# Patient Record
Sex: FEMALE | Race: WHITE | NOT HISPANIC OR LATINO | Employment: FULL TIME | URBAN - METROPOLITAN AREA
[De-identification: names, ages, dates, MRNs, and addresses within clinical notes are randomized per-mention and may not be internally consistent; named-entity substitution may affect disease eponyms.]

---

## 2018-12-26 PROBLEM — O14.90 PRE-ECLAMPSIA: Status: ACTIVE | Noted: 2018-12-26

## 2018-12-26 PROBLEM — D64.9 ANEMIA: Status: ACTIVE | Noted: 2018-12-26

## 2018-12-26 PROBLEM — O11.9 PRE-ECLAMPSIA SUPERIMPOSED ON CHRONIC HYPERTENSION: Status: ACTIVE | Noted: 2018-12-26

## 2018-12-27 ENCOUNTER — APPOINTMENT (OUTPATIENT)
Dept: INTERNAL MEDICINE | Facility: MEDICAL CENTER | Age: 23
End: 2018-12-27
Payer: MEDICAID

## 2019-01-28 ENCOUNTER — TELEPHONE (OUTPATIENT)
Dept: SCHEDULING | Facility: IMAGING CENTER | Age: 24
End: 2019-01-28

## 2019-03-01 ENCOUNTER — OFFICE VISIT (OUTPATIENT)
Dept: INTERNAL MEDICINE | Facility: MEDICAL CENTER | Age: 24
End: 2019-03-01
Payer: MEDICAID

## 2019-03-01 VITALS
DIASTOLIC BLOOD PRESSURE: 98 MMHG | HEART RATE: 78 BPM | TEMPERATURE: 98.7 F | SYSTOLIC BLOOD PRESSURE: 134 MMHG | OXYGEN SATURATION: 98 % | WEIGHT: 152.8 LBS | HEIGHT: 66 IN | BODY MASS INDEX: 24.56 KG/M2

## 2019-03-01 DIAGNOSIS — L70.9 ACNE, UNSPECIFIED ACNE TYPE: ICD-10-CM

## 2019-03-01 DIAGNOSIS — F32.A ANXIETY AND DEPRESSION: ICD-10-CM

## 2019-03-01 DIAGNOSIS — I10 HYPERTENSION, UNSPECIFIED TYPE: Chronic | ICD-10-CM

## 2019-03-01 DIAGNOSIS — D64.9 ANEMIA, UNSPECIFIED TYPE: ICD-10-CM

## 2019-03-01 DIAGNOSIS — Z00.00 HEALTH CARE MAINTENANCE: ICD-10-CM

## 2019-03-01 DIAGNOSIS — I77.810 AORTIC ROOT DILATATION (HCC): ICD-10-CM

## 2019-03-01 DIAGNOSIS — F41.9 ANXIETY AND DEPRESSION: ICD-10-CM

## 2019-03-01 PROCEDURE — 99204 OFFICE O/P NEW MOD 45 MIN: CPT | Mod: GC | Performed by: INTERNAL MEDICINE

## 2019-03-01 RX ORDER — LISINOPRIL 5 MG/1
5 TABLET ORAL DAILY
Qty: 30 TAB | Refills: 0 | Status: SHIPPED | OUTPATIENT
Start: 2019-03-01 | End: 2019-04-06 | Stop reason: SDUPTHER

## 2019-03-01 ASSESSMENT — PATIENT HEALTH QUESTIONNAIRE - PHQ9
SUM OF ALL RESPONSES TO PHQ QUESTIONS 1-9: 15
5. POOR APPETITE OR OVEREATING: 2 - MORE THAN HALF THE DAYS
CLINICAL INTERPRETATION OF PHQ2 SCORE: 1

## 2019-03-01 NOTE — ASSESSMENT & PLAN NOTE
3/1/19-update    Influenza- 2018  Tdap -8/7/18  Pap smear- last year- negative  Not a candidate mammogram/colonscopy/DEXA yet

## 2019-03-01 NOTE — PROGRESS NOTES
New Patient to Establish    Reason to establish: Never had PCP in the past and evaluation of ongoing symptoms    CC: high BP    HPI: 23 yr old female patient with PMHx of HTN, aortic root dilatation comes in to establish care.  Patient never followed up with PCP in the past. Followed up with OBG/GYN and pediatric cardiology in the past for her chronic conditions.    # HTN: Diagnosed at age 13 and followed up with pediatric cardiology since then. Was on lisinopril intermittent on different doses. During recent pregnancy was on labetalol but currently not on any mediations. Patient does not check her BPs at home. Patient does report of intermittent palpitations, SOB on excessive exertion when is running or fast walking or working out along with intermittent headaches. Denies chest pain/LOC/orthopnea/PND. Patient also has insomnia and unable to sleep.     # Aortic root dilatation: Patient was told by her pediatric cardiologist that she has this condition. As mentioned above she denies other symptoms.Recently when she followed up with cardiology he told follow up with adult cardiology as soon as possible. Daughter youngest one has heart murmur.    #Anxiety and depression:  Chronic history. Feels lonely. PHQ9 15. Denies SI/HI. Patient has chronic anxiety and depression.States she does not have anybody to talk to or share her problems.Under lot of stress adjusting to her current living situation.Has history of multiple stressors in the past.    Patient Active Problem List    Diagnosis Date Noted   • Aortic root dilatation (HCC) 03/01/2019   • Health care maintenance 03/01/2019   • Pre-eclampsia superimposed on chronic hypertension 12/26/2018   • Anemia 12/26/2018   • Hypertension 04/23/2015   • IUGR (intrauterine growth restriction) 04/23/2015       Past Medical History:   Diagnosis Date   • Anxiety    • Depression    • Head ache    • Hypertension        Current Outpatient Prescriptions   Medication Sig Dispense Refill    • labetalol (NORMODYNE) 300 MG TABS Take 300 mg by mouth 3 times a day.     • Prenatal MV-Min-Fe Fum-FA-DHA (PRENATAL 1 PO) Take  by mouth.       No current facility-administered medications for this visit.        Allergies as of 03/01/2019   • (No Known Allergies)       Social History     Social History   • Marital status: Single     Spouse name: N/A   • Number of children: N/A   • Years of education: N/A     Occupational History   • Not on file.     Social History Main Topics   • Smoking status: Former Smoker     Packs/day: 1.00     Years: 5.00   • Smokeless tobacco: Never Used   • Alcohol use No   • Drug use: Yes     Types: Marijuana      Comment: 1-2 times a day -1gm   • Sexual activity: Yes     Partners: Male     Birth control/ protection: Surgical     Other Topics Concern   • Not on file     Social History Narrative   • No narrative on file       Family History   Problem Relation Age of Onset   • Alcohol/Drug Mother    • Hypertension Father    • Arthritis Maternal Grandmother    • Cancer Maternal Grandfather    • Hypertension Maternal Grandfather    • Hypertension Paternal Grandmother    • Heart Disease Paternal Grandfather    • Hypertension Paternal Grandfather        Past Surgical History:   Procedure Laterality Date   • GYN SURGERY      c/s   • PRIMARY C SECTION         ROS: As per HPI. Additional pertinent symptoms as noted below.    Constitutional: Denies fever/chills/weight changes.   Eyes: Denies changes/pain in vision  ENT: Denies sore throat/congestion/ear ache.   Cardiovascular: Denies chest pain/edema. +Intermittent palpitations.  Respiratory: Denies SOB/cough/PND/orthopnea  Abdomen: Denies difficulty swallowing/ diarrhea/constipation/abdominal pain/nausea/vomiting  Genitourinary: Normal urinary habits.   Musculo-skeletal: normal ambulation.Denies muscle or joint pains.  Skin: Denies rash/lesions.  Neurological: Denies weakness/tingling/numbness. + intermittent headache  Psychological: good mood  "and cooperative.+insomnia,anxiety and depression.         /96 (BP Location: Left arm, Patient Position: Sitting, BP Cuff Size: Adult)   Pulse 78   Temp 37.1 °C (98.7 °F)   Ht 1.681 m (5' 6.2\")   Wt 69.3 kg (152 lb 12.8 oz)   SpO2 98%   BMI 24.51 kg/m²     Physical Exam  General:  Alert and oriented, No apparent distress.    Eyes: Pupils equal and reactive. No scleral icterus.    Throat: Clear no erythema or exudates noted.    Neck: Supple. No lymphadenopathy noted. Thyroid not enlarged.    Lungs: Clear to auscultation and percussion bilaterally.    Cardiovascular: Regular rate and rhythm. No murmurs, rubs or gallops.    Abdomen:  Benign. No rebound or guarding noted.    Extremities: No clubbing, cyanosis, edema.    Skin: Clear. No rash or suspicious skin lesions noted.    Neurological: Oriented to time, place, and person .Cranial nerves intact. No motor/sensory deficits.Reflexes were normal and symmetrical in both upper and lower extremities     Musculoskeletal : NROM of all extremities. No tenderness or deformity noted.     Note: I have reviewed all pertinent labs and diagnostic tests associated with this visit with specific comments listed under the assessment and plan below    Assessment and Plan    1. Hypertension, unspecified type  At the age of 13- dx with HTN  Was on lisinopril initially.  During pregnancy was on labetolol  4 months back delivered baby- currently not on any medication.  Today BP is 144/96 with repeat level 134/98  Reports of occasional palpitations and headaches.  - ordered CMP,micro albumin creatinine ratio for next visit and started on lisinopril 5 mg PO once daily.    2. Aortic root dilatation (HCC)  Dx 2016  Was following up with pediatric cardiology -Cesar Palafox from age 13 .  Occasional palpitations, SOB on excessive exertion like running.  Currently not on any medications.  Ordered referral to cardiology.    3. Anemia, unspecified type  Was noted in the prior labs in " the records.  States she was worked up [facility].Requested records.  Ordered CBC for next visit and will consider further work up after results of lab work.  Denies active bleeding issues.    4. Anxiety and depression  Chronic history  PHQ9 15  Denies SI/HI  Ordered referral to psychology  Will consider further management after reviewing labs next visit  CBC,CMP,TSH ordered for next visit    5. Acne, unspecified acne type  Has noted to have multiple scattered lesions in different stage  Ordered referral to dermatology as she wished to follow up with dermatologist though offered treatment. If she is unable to get to see dermatology soon then will consider starting on treatment with benzoyl peroxide and topical antibiotics next visit.    6. Health care maintenance  Influenza- 2018  Tdap -8/7/18  Pap smear- last year- negative  Not a candidate mammogram/colonscopy/DEXA yet  Requested all records. Will update after reviewing prior records.        Risk Assessment (discuss potential complications a function of chronic problems): spent 35 min's discussing plans of management and educating patient regarding her current conditions and related complications    Complexity (discuss number of co-morbidities): discussed HTN, Aortic root dilatation, anemia, anxiety and depression, acne and preventive screening measures.    Signed by: Arben Mei M.D.

## 2019-03-01 NOTE — PATIENT INSTRUCTIONS
Generalized Anxiety Disorder  Generalized anxiety disorder (IDRIS) is a mental disorder. It interferes with life functions, including relationships, work, and school.  IDRIS is different from normal anxiety, which everyone experiences at some point in their lives in response to specific life events and activities. Normal anxiety actually helps us prepare for and get through these life events and activities. Normal anxiety goes away after the event or activity is over.   IDRIS causes anxiety that is not necessarily related to specific events or activities. It also causes excess anxiety in proportion to specific events or activities. The anxiety associated with IDRIS is also difficult to control. IDRIS can vary from mild to severe. People with severe IDRIS can have intense waves of anxiety with physical symptoms (panic attacks).   SYMPTOMS  The anxiety and worry associated with IDRIS are difficult to control. This anxiety and worry are related to many life events and activities and also occur more days than not for 6 months or longer. People with IDRIS also have three or more of the following symptoms (one or more in children):  · Restlessness.    · Fatigue.  · Difficulty concentrating.    · Irritability.  · Muscle tension.  · Difficulty sleeping or unsatisfying sleep.  DIAGNOSIS  IDRIS is diagnosed through an assessment by your health care provider. Your health care provider will ask you questions about your mood, physical symptoms, and events in your life. Your health care provider may ask you about your medical history and use of alcohol or drugs, including prescription medicines. Your health care provider may also do a physical exam and blood tests. Certain medical conditions and the use of certain substances can cause symptoms similar to those associated with IDRIS. Your health care provider may refer you to a mental health specialist for further evaluation.  TREATMENT  The following therapies are usually used to treat IDRIS:    · Medication. Antidepressant medication usually is prescribed for long-term daily control. Antianxiety medicines may be added in severe cases, especially when panic attacks occur.    · Talk therapy (psychotherapy). Certain types of talk therapy can be helpful in treating IDRIS by providing support, education, and guidance. A form of talk therapy called cognitive behavioral therapy can teach you healthy ways to think about and react to daily life events and activities.  · Stress management techniques. These include yoga, meditation, and exercise and can be very helpful when they are practiced regularly.  A mental health specialist can help determine which treatment is best for you. Some people see improvement with one therapy. However, other people require a combination of therapies.  This information is not intended to replace advice given to you by your health care provider. Make sure you discuss any questions you have with your health care provider.  Document Released: 04/14/2014 Document Revised: 01/08/2016 Document Reviewed: 04/14/2014  [employer] Interactive Patient Education © 2017 [employer]  Depression, Adult  Depression refers to feeling sad, low, down in the dumps, blue, gloomy, or empty. In general, there are two kinds of depression:  1. Normal sadness or normal grief. This kind of depression is one that we all feel from time to time after upsetting life experiences, such as the loss of a job or the ending of a relationship. This kind of depression is considered normal, is short lived, and resolves within a few days to 2 weeks. Depression experienced after the loss of a loved one (bereavement) often lasts longer than 2 weeks but normally gets better with time.  2. Clinical depression. This kind of depression lasts longer than normal sadness or normal grief or interferes with your ability to function at home, at work, and in school. It also interferes with your personal relationships. It affects almost  every aspect of your life. Clinical depression is an illness.  Symptoms of depression can also be caused by conditions other than those mentioned above, such as:  · Physical illness. Some physical illnesses, including underactive thyroid gland (hypothyroidism), severe anemia, specific types of cancer, diabetes, uncontrolled seizures, heart and lung problems, strokes, and chronic pain are commonly associated with symptoms of depression.  · Side effects of some prescription medicine. In some people, certain types of medicine can cause symptoms of depression.  · Substance abuse. Abuse of alcohol and illicit drugs can cause symptoms of depression.  SYMPTOMS  Symptoms of normal sadness and normal grief include the following:  · Feeling sad or crying for short periods of time.  · Not caring about anything (apathy).  · Difficulty sleeping or sleeping too much.  · No longer able to enjoy the things you used to enjoy.  · Desire to be by oneself all the time (social isolation).  · Lack of energy or motivation.  · Difficulty concentrating or remembering.  · Change in appetite or weight.  · Restlessness or agitation.  Symptoms of clinical depression include the same symptoms of normal sadness or normal grief and also the following symptoms:  · Feeling sad or crying all the time.  · Feelings of guilt or worthlessness.  · Feelings of hopelessness or helplessness.  · Thoughts of suicide or the desire to harm yourself (suicidal ideation).  · Loss of touch with reality (psychotic symptoms). Seeing or hearing things that are not real (hallucinations) or having false beliefs about your life or the people around you (delusions and paranoia).  DIAGNOSIS   The diagnosis of clinical depression is usually based on how bad the symptoms are and how long they have lasted. Your health care provider will also ask you questions about your medical history and substance use to find out if physical illness, use of prescription medicine, or  substance abuse is causing your depression. Your health care provider may also order blood tests.  TREATMENT   Often, normal sadness and normal grief do not require treatment. However, sometimes antidepressant medicine is given for bereavement to ease the depressive symptoms until they resolve.  The treatment for clinical depression depends on how bad the symptoms are but often includes antidepressant medicine, counseling with a mental health professional, or both. Your health care provider will help to determine what treatment is best for you.  Depression caused by physical illness usually goes away with appropriate medical treatment of the illness. If prescription medicine is causing depression, talk with your health care provider about stopping the medicine, decreasing the dose, or changing to another medicine.  Depression caused by the abuse of alcohol or illicit drugs goes away when you stop using these substances. Some adults need professional help in order to stop drinking or using drugs.  SEEK IMMEDIATE MEDICAL CARE IF:  · You have thoughts about hurting yourself or others.  · You lose touch with reality (have psychotic symptoms).  · You are taking medicine for depression and have a serious side effect.  FOR MORE INFORMATION  · National [place] on Mental Illness: www.jana.org   · National [place] of Mental Health: www.nimh.nih.gov      This information is not intended to replace advice given to you by your health care provider. Make sure you discuss any questions you have with your health care provider.     Document Released: 12/15/2001 Document Revised: 01/08/2016 Document Reviewed: 03/18/2013  [employer] Interactive Patient Education ©2016 [employer]

## 2019-03-01 NOTE — ASSESSMENT & PLAN NOTE
At the age of 13 dx with HTN  Was on lisinopril initially.  During pregnancy was on labetolol  4 months back delivered baby- currently not on any medication.

## 2019-03-01 NOTE — LETTER
[facility]  Arben Mei M.D.  [address]  [place] [address]  Fax: [phone]   Authorization for Release/Disclosure of   Protected Health Information   Name: RICHELLE US : 1995 SSN: [id number]   Address: [address]  [place] [address] Phone:    [phone] (home)    I authorize the entity listed below to release/disclose the PHI below to:   [facility]/Arben Mei M.D. and Arben Mei M.D.   Provider or Entity Name:  Dr Menchaca gyn    Address   City, [facility], [place]   Phone:      Fax:     Reason for request: continuity of care   Information to be released:    [  ] LAST COLONOSCOPY,  including any PATH REPORT and follow-up  [  ] LAST FIT/COLOGUARD RESULT [  ] LAST DEXA  [  ] LAST MAMMOGRAM  [  ] LAST PAP  [  ] LAST LABS [  ] RETINA EXAM REPORT  [  ] IMMUNIZATION RECORDS  [  ] Release all info      [  ] Check here and initial the line next to each item to release ALL health information INCLUDING  _____ Care and treatment for drug and / or alcohol abuse  _____ HIV testing, infection status, or AIDS  _____ Genetic Testing    DATES OF SERVICE OR TIME PERIOD TO BE DISCLOSED: _____________  I understand and acknowledge that:  * This Authorization may be revoked at any time by you in writing, except if your health information has already been used or disclosed.  * Your health information that will be used or disclosed as a result of you signing this authorization could be re-disclosed by the recipient. If this occurs, your re-disclosed health information may no longer be protected by State or Federal laws.  * You may refuse to sign this Authorization. Your refusal will not affect your ability to obtain treatment.  * This Authorization becomes effective upon signing and will  on (date) __________.      If no date is indicated, this Authorization will  one (1) year from the signature date.    Name: Richelle Us    Signature:   Date:     3/1/2019       PLEASE FAX  REQUESTED RECORDS BACK TO: [phone]

## 2019-03-01 NOTE — LETTER
[facility]  Arben Mei M.D.  [address]  [place] [address]  Fax: [phone]   Authorization for Release/Disclosure of   Protected Health Information   Name: RICHELLE US : 1995 SSN: [id number]   Address: [address]  [place] [address] Phone:    [phone] (home)    I authorize the entity listed below to release/disclose the PHI below to:   [facility]/Arben Mei M.D. and Arben Mei M.D.   Provider or Entity Name:  Dr winston  cards   Address   City, State, [facility]   Phone:      Fax:     Reason for request: continuity of care   Information to be released:    [  ] LAST COLONOSCOPY,  including any PATH REPORT and follow-up  [  ] LAST FIT/COLOGUARD RESULT [  ] LAST DEXA  [  ] LAST MAMMOGRAM  [  ] LAST PAP  [  ] LAST LABS [  ] RETINA EXAM REPORT  [  ] IMMUNIZATION RECORDS  [ X ] Release all info      [  ] Check here and initial the line next to each item to release ALL health information INCLUDING  _____ Care and treatment for drug and / or alcohol abuse  _____ HIV testing, infection status, or AIDS  _____ Genetic Testing    DATES OF SERVICE OR TIME PERIOD TO BE DISCLOSED: _____________  I understand and acknowledge that:  * This Authorization may be revoked at any time by you in writing, except if your health information has already been used or disclosed.  * Your health information that will be used or disclosed as a result of you signing this authorization could be re-disclosed by the recipient. If this occurs, your re-disclosed health information may no longer be protected by State or Federal laws.  * You may refuse to sign this Authorization. Your refusal will not affect your ability to obtain treatment.  * This Authorization becomes effective upon signing and will  on (date) __________.      If no date is indicated, this Authorization will  one (1) year from the signature date.    Name: Richelle Us    Signature:   Date:     3/1/2019       PLEASE FAX  REQUESTED RECORDS BACK TO: [phone]

## 2019-03-06 ENCOUNTER — TELEPHONE (OUTPATIENT)
Dept: INTERNAL MEDICINE | Facility: MEDICAL CENTER | Age: 24
End: 2019-03-06

## 2019-03-06 ENCOUNTER — TELEPHONE (OUTPATIENT)
Dept: CARDIOLOGY | Facility: MEDICAL CENTER | Age: 24
End: 2019-03-06

## 2019-03-06 NOTE — TELEPHONE ENCOUNTER
Pt called LM asking if we could possibly send her referral to another place that takes her insurance,pt states she would like her referral for dermatology sent somewhere where they are accepting new medicaid pts, and for her psychiatrist send where they take her insurance.

## 2019-03-08 ENCOUNTER — OFFICE VISIT (OUTPATIENT)
Dept: CARDIOLOGY | Facility: MEDICAL CENTER | Age: 24
End: 2019-03-08
Payer: MEDICAID

## 2019-03-08 VITALS
WEIGHT: 149 LBS | HEART RATE: 86 BPM | OXYGEN SATURATION: 99 % | SYSTOLIC BLOOD PRESSURE: 150 MMHG | DIASTOLIC BLOOD PRESSURE: 100 MMHG | BODY MASS INDEX: 23.95 KG/M2 | HEIGHT: 66 IN

## 2019-03-08 DIAGNOSIS — I77.810 AORTIC ROOT DILATION (HCC): ICD-10-CM

## 2019-03-08 DIAGNOSIS — I10 ESSENTIAL HYPERTENSION: Chronic | ICD-10-CM

## 2019-03-08 DIAGNOSIS — I77.810 AORTIC ROOT DILATATION (HCC): ICD-10-CM

## 2019-03-08 LAB — EKG IMPRESSION: NORMAL

## 2019-03-08 PROCEDURE — 93000 ELECTROCARDIOGRAM COMPLETE: CPT | Performed by: INTERNAL MEDICINE

## 2019-03-08 PROCEDURE — 99214 OFFICE O/P EST MOD 30 MIN: CPT | Performed by: INTERNAL MEDICINE

## 2019-03-08 ASSESSMENT — ENCOUNTER SYMPTOMS
COUGH: 1
EYE PAIN: 1
PND: 0
HEADACHES: 1
INSOMNIA: 0
LOSS OF CONSCIOUSNESS: 0
DIAPHORESIS: 1
CONSTIPATION: 1
CHILLS: 1
PALPITATIONS: 0
WEIGHT LOSS: 1
HEARTBURN: 1
FEVER: 0
SHORTNESS OF BREATH: 1
NAUSEA: 1
BACK PAIN: 1
TINGLING: 1
BLURRED VISION: 0
ORTHOPNEA: 0
ABDOMINAL PAIN: 1
WEAKNESS: 1
NERVOUS/ANXIOUS: 1
MYALGIAS: 0
DIZZINESS: 1
BRUISES/BLEEDS EASILY: 1

## 2019-03-08 NOTE — TELEPHONE ENCOUNTER
Called spoke to pt and notified her of Elly's notes, pt agreed and stated she would f/u with Dr. Mei at her appointment.

## 2019-03-09 NOTE — PROGRESS NOTES
Aortic root dilatation      Subjective:   Keiko Us is a 23 y.o. female who presents today referred by Dr. Baldemar Amaya, pediatric cardiologist for ongoing care for aortic root dilatation and hypertension.    The patient reports a history of hypertension diagnosed at age 13 treated with lisinopril except during her 3 pregnancies in which she was apparently transitioned to labetalol.    Last delivery was 4 months ago and was started back on lisinopril 5 mg daily.    The patient does not monitor her blood pressure.    The patient was formally last seen as a patient by Dr. Amaya 2 years ago but recently was in his clinic with her 4-year-old daughter and it was strongly suggested that she secure a cardiology appointment.    The patient has been under a lot of stress.    The patient denies chest pain or shortness of breath.    Has been under the care of Dr. Parks, high risk OB  Last delivery [facility].    Social history  Single.  3 children 6-year-old, 4-year-old and 4-month-old.    Past Medical History:   Diagnosis Date   • Anxiety    • Depression    • Head ache    • Hypertension      Past Surgical History:   Procedure Laterality Date   • GYN SURGERY      c/s   • PRIMARY C SECTION       Family History   Problem Relation Age of Onset   • Alcohol/Drug Mother    • Hypertension Father    • Arthritis Maternal Grandmother    • Cancer Maternal Grandfather    • Hypertension Maternal Grandfather    • Hypertension Paternal Grandmother    • Heart Disease Paternal Grandfather    • Hypertension Paternal Grandfather      Social History     Social History   • Marital status: Single     Spouse name: N/A   • Number of children: N/A   • Years of education: N/A     Occupational History   • Not on file.     Social History Main Topics   • Smoking status: Former Smoker     Packs/day: 1.00     Years: 5.00   • Smokeless tobacco: Never Used   • Alcohol use No   • Drug use: Yes     Types: Marijuana      Comment: 1-2 times a day -1gm  "  • Sexual activity: Yes     Partners: Male     Birth control/ protection: Surgical     Other Topics Concern   • Not on file     Social History Narrative   • No narrative on file     No Known Allergies  Outpatient Encounter Prescriptions as of 3/8/2019   Medication Sig Dispense Refill   • lisinopril (PRINIVIL) 5 MG Tab Take 1 Tab by mouth every day. 30 Tab 0   • labetalol (NORMODYNE) 300 MG TABS Take 300 mg by mouth 3 times a day.     • Prenatal MV-Min-Fe Fum-FA-DHA (PRENATAL 1 PO) Take  by mouth.       No facility-administered encounter medications on file as of 3/8/2019.      Review of Systems   Constitutional: Positive for chills, diaphoresis, malaise/fatigue and weight loss. Negative for fever.   HENT: Positive for tinnitus. Negative for congestion.    Eyes: Positive for pain. Negative for blurred vision.   Respiratory: Positive for cough and shortness of breath.    Cardiovascular: Negative for chest pain, palpitations, orthopnea, leg swelling and PND.   Gastrointestinal: Positive for abdominal pain, constipation, heartburn and nausea.   Genitourinary: Negative for dysuria.   Musculoskeletal: Positive for back pain. Negative for joint pain and myalgias.   Skin: Positive for itching and rash.   Neurological: Positive for dizziness, tingling, weakness and headaches. Negative for loss of consciousness.   Endo/Heme/Allergies: Bruises/bleeds easily.   Psychiatric/Behavioral: The patient is nervous/anxious. The patient does not have insomnia.         Objective:   /100 (BP Location: Left arm, Patient Position: Sitting, BP Cuff Size: Adult)   Pulse 86   Ht 1.681 m (5' 6.2\")   Wt 67.6 kg (149 lb)   SpO2 99%   BMI 23.90 kg/m²     Physical Exam   Constitutional: She is oriented to person, place, and time. She appears well-developed and well-nourished.   HENT:   Head: Normocephalic and atraumatic.   Eyes: Pupils are equal, round, and reactive to light. EOM are normal. No scleral icterus.   Neck: Neck supple. No " JVD present. No thyromegaly present.   Cardiovascular: Normal rate, regular rhythm, normal heart sounds and intact distal pulses.  Exam reveals no gallop and no friction rub.    No murmur heard.  Pulmonary/Chest: Effort normal and breath sounds normal. No respiratory distress. She has no wheezes. She has no rales.   Abdominal: Soft. Bowel sounds are normal. She exhibits no mass. There is no tenderness.   Musculoskeletal: Normal range of motion. She exhibits no edema or deformity.   Lymphadenopathy:     She has no cervical adenopathy.   Neurological: She is alert and oriented to person, place, and time. No cranial nerve deficit. She exhibits normal muscle tone.   Skin: Skin is warm and dry.   Psychiatric: She has a normal mood and affect. Her behavior is normal.     EKG 03/08/2019 normal sinus rhythm, rate 73.  Within normal limits.  Reviewed by myself.    Assessment:     1. Aortic root dilation (HCC)     2. Aortic root dilatation (HCC)  EKG    EC-ECHOCARDIOGRAM COMPLETE W/O CONT   3. Essential hypertension  EC-ECHOCARDIOGRAM COMPLETE W/O CONT       Medical Decision Making:  Today's Assessment / Status / Plan:     Assessment  1.  Aortic root dilatation current status unknown.  2.  Hypertension.  Uncontrolled on current dose of lisinopril.    Recommendation Discussion  1.  I had a focused discussion with the patient which I emphasized the need for close blood pressure monitoring and medication adjustment as needed to normalize her blood pressure.  2.  Instructed to obtain a blood pressure cuff and maintain a daily blood pressure log.  3.  Echocardiogram.  4.  Obtain medical records from Dr. Amaya's office in [facility].  5.  Maintain communication through My chart.  6.  If blood pressure remains above 130 systolic she is to contact us for outpatient medication adjustment prior to next appointment.  7.  Follow-up 4-6 weeks to review blood pressure.

## 2019-03-13 ENCOUNTER — TELEPHONE (OUTPATIENT)
Dept: CARDIOLOGY | Facility: MEDICAL CENTER | Age: 24
End: 2019-03-13

## 2019-03-13 NOTE — TELEPHONE ENCOUNTER
I had fax medical records request to [facility] and to DR. Amaya pediatric cardiologist Fax # [phone].

## 2019-03-25 ENCOUNTER — TELEPHONE (OUTPATIENT)
Dept: CARDIOLOGY | Facility: MEDICAL CENTER | Age: 24
End: 2019-03-25

## 2019-03-25 NOTE — TELEPHONE ENCOUNTER
Medical Records from [facility]  [facility] was send to us on 03/25/2019 send to scanning.

## 2019-03-26 ENCOUNTER — HOSPITAL ENCOUNTER (OUTPATIENT)
Dept: CARDIOLOGY | Facility: MEDICAL CENTER | Age: 24
End: 2019-03-26
Attending: INTERNAL MEDICINE
Payer: MEDICAID

## 2019-03-26 DIAGNOSIS — I77.810 AORTIC ROOT DILATATION (HCC): ICD-10-CM

## 2019-03-26 DIAGNOSIS — I10 ESSENTIAL HYPERTENSION: Chronic | ICD-10-CM

## 2019-03-26 PROCEDURE — 93306 TTE W/DOPPLER COMPLETE: CPT

## 2019-03-27 ENCOUNTER — TELEPHONE (OUTPATIENT)
Dept: CARDIOLOGY | Facility: MEDICAL CENTER | Age: 24
End: 2019-03-27

## 2019-03-27 LAB
LV EJECT FRACT  99904: 65
LV EJECT FRACT MOD 2C 99903: 61.24
LV EJECT FRACT MOD 4C 99902: 60.5
LV EJECT FRACT MOD BP 99901: 60.02

## 2019-03-27 PROCEDURE — 93306 TTE W/DOPPLER COMPLETE: CPT | Mod: 26 | Performed by: INTERNAL MEDICINE

## 2019-03-27 NOTE — TELEPHONE ENCOUNTER
Reviewed result with patient.  Pt verbalizes understanding and is appreciative of information given.    -------------------  echo results   Received: Today Message Contents   Adry Zhao R.N.   Phone Number: [phone]          BRIANNA/kwasi     Pt calling for echo results, please call her on  before 4:30pm (her own # isn't working) or if after 4:30 on alternate #[phone].

## 2019-05-16 ENCOUNTER — TELEPHONE (OUTPATIENT)
Dept: CARDIOLOGY | Facility: MEDICAL CENTER | Age: 24
End: 2019-05-16

## 2019-05-16 DIAGNOSIS — R00.2 PALPITATION: ICD-10-CM

## 2019-05-16 DIAGNOSIS — I10 ESSENTIAL HYPERTENSION: Chronic | ICD-10-CM

## 2019-05-16 NOTE — TELEPHONE ENCOUNTER
"Message   Received: Today   Message Contents   Neli Pimentel R.N.   Phone Number: [phone]             BRIANNA/Chanel     Pt has question about EKG done in office back on 3/8. She would please like a call back at [phone]      Contacted patient, answered questions regarding EKG from March.     Patient reports, that when she is relaxing out of no where she will feel her heart pounding and her HR will be 122.     She also describes a near syncope event when she was washing her dishes she \"woke up when she dropped a dish\" She was unsure exactly what happened.  But she felt her HR was fast then.     Inquired if patient can come in tomorrow morning for an EKG.  Patient stated tomorrow wouldn't work.  Scheduled patient for Monday 5/20 for EKG to evaluate HR and any possible changes.    Detailed discussion about when to go to the ER or call EMS took place.  Patient verbalized all understanding.     EKG ordered    FYI to SW  "

## 2019-06-04 ENCOUNTER — APPOINTMENT (OUTPATIENT)
Dept: INTERNAL MEDICINE | Facility: MEDICAL CENTER | Age: 24
End: 2019-06-04
Payer: MEDICAID

## 2019-06-04 ENCOUNTER — PATIENT MESSAGE (OUTPATIENT)
Dept: INTERNAL MEDICINE | Facility: MEDICAL CENTER | Age: 24
End: 2019-06-04

## 2019-07-05 ENCOUNTER — OFFICE VISIT (OUTPATIENT)
Dept: CARDIOLOGY | Facility: MEDICAL CENTER | Age: 24
End: 2019-07-05
Payer: MEDICAID

## 2019-07-05 VITALS
BODY MASS INDEX: 22.5 KG/M2 | SYSTOLIC BLOOD PRESSURE: 134 MMHG | DIASTOLIC BLOOD PRESSURE: 80 MMHG | HEIGHT: 66 IN | OXYGEN SATURATION: 96 % | HEART RATE: 90 BPM | WEIGHT: 140 LBS

## 2019-07-05 DIAGNOSIS — R00.2 PALPITATIONS: ICD-10-CM

## 2019-07-05 DIAGNOSIS — I77.810 AORTIC ROOT DILATATION (HCC): ICD-10-CM

## 2019-07-05 DIAGNOSIS — I10 ESSENTIAL HYPERTENSION: Chronic | ICD-10-CM

## 2019-07-05 PROCEDURE — 99214 OFFICE O/P EST MOD 30 MIN: CPT | Performed by: INTERNAL MEDICINE

## 2019-07-05 ASSESSMENT — ENCOUNTER SYMPTOMS
DIZZINESS: 0
COUGH: 0
MYALGIAS: 0
PALPITATIONS: 0
LOSS OF CONSCIOUSNESS: 0
SHORTNESS OF BREATH: 0

## 2019-07-05 NOTE — PROGRESS NOTES
Aortic root dilatation      Subjective:   Keiko Us is a 24 y.o. female who presents today referred by Dr. Baldemar Amaya, pediatric cardiologist for ongoing care for aortic root dilatation and hypertension.    Last seen on 3/8/2019.    Since 3/8/2019 appointment the patient has noted daily palpitations without lightheadedness or syncope.  Has some orthostatic dizziness.  Compliant with lisinopril and monitoring blood pressure.  Is maintaining a home blood pressure log.    The patient reports a history of hypertension diagnosed at age 13 treated with lisinopril except during her 3 pregnancies in which she was apparently transitioned to labetalol.    Last delivery was 4 months ago and was started back on lisinopril 5 mg daily.    The patient does not monitor her blood pressure.    The patient was formally last seen as a patient by Dr. Amaya 2 years ago but recently was in his clinic with her 4-year-old daughter and it was strongly suggested that she secure a cardiology appointment.    The patient has been under a lot of stress.    The patient denies chest pain or shortness of breath.    Has been under the care of Dr. Parks, high risk OB  Last delivery [facility].    Social history  Single.  3 children 6-year-old, 4-year-old and 4-month-old.    Past Medical History:   Diagnosis Date   • Anxiety    • Depression    • Head ache    • Hypertension      Past Surgical History:   Procedure Laterality Date   • GYN SURGERY      c/s   • PRIMARY C SECTION       Family History   Problem Relation Age of Onset   • Alcohol/Drug Mother    • Hypertension Father    • Arthritis Maternal Grandmother    • Cancer Maternal Grandfather    • Hypertension Maternal Grandfather    • Hypertension Paternal Grandmother    • Heart Disease Paternal Grandfather    • Hypertension Paternal Grandfather      Social History     Social History   • Marital status: Single     Spouse name: N/A   • Number of children: N/A   • Years of education: N/A  "    Occupational History   • Not on file.     Social History Main Topics   • Smoking status: Former Smoker     Packs/day: 1.00     Years: 5.00   • Smokeless tobacco: Never Used   • Alcohol use No   • Drug use: Yes     Types: Marijuana      Comment: 1-2 times a day -1gm   • Sexual activity: Yes     Partners: Male     Birth control/ protection: Surgical     Other Topics Concern   • Not on file     Social History Narrative   • No narrative on file     No Known Allergies  Outpatient Encounter Prescriptions as of 7/5/2019   Medication Sig Dispense Refill   • lisinopril (PRINIVIL) 5 MG Tab Take 1 Tab by mouth every day. 30 Tab 0   • [DISCONTINUED] labetalol (NORMODYNE) 300 MG TABS Take 300 mg by mouth 3 times a day.     • [DISCONTINUED] Prenatal MV-Min-Fe Fum-FA-DHA (PRENATAL 1 PO) Take  by mouth.       No facility-administered encounter medications on file as of 7/5/2019.      Review of Systems   Respiratory: Negative for cough and shortness of breath.    Cardiovascular: Negative for chest pain and palpitations.   Musculoskeletal: Negative for myalgias.   Neurological: Negative for dizziness and loss of consciousness.        Objective:   /80 (BP Location: Left arm, Patient Position: Sitting, BP Cuff Size: Adult)   Pulse 90   Ht 1.676 m (5' 6\")   Wt 63.5 kg (140 lb)   SpO2 96%   BMI 22.60 kg/m²     Physical Exam   Constitutional: She is oriented to person, place, and time. She appears well-developed and well-nourished.   Eyes: Pupils are equal, round, and reactive to light. Conjunctivae are normal.   Neck: Normal range of motion. Neck supple. No JVD present.   Cardiovascular: Normal rate, regular rhythm, normal heart sounds and intact distal pulses.    Pulmonary/Chest: Effort normal and breath sounds normal. No accessory muscle usage. No respiratory distress. She has no wheezes. She has no rales.   Musculoskeletal: She exhibits no edema.   Neurological: She is alert and oriented to person, place, and time. "   Skin: Skin is warm, dry and intact. No rash noted. No cyanosis. Nails show no clubbing.   Psychiatric: She has a normal mood and affect. Her behavior is normal.     EKG 03/08/2019 normal sinus rhythm, rate 73.  Within normal limits.  Reviewed by myself.    ECHOCARDIOGRAM 03/26/2019  Normal left ventricular systolic function.  Left ventricular ejection fraction is visually estimated to be 65%.  Unable to estimate pulmonary artery pressure due to an inadequate   tricuspid regurgitant jet.  No valve abnormalities.   No prior study is available for comparison.    Assessment:     1. Palpitations  Holter Monitor Study   2. Essential hypertension     3. Aortic root dilatation (HCC)         Medical Decision Making:  Today's Assessment / Status / Plan:     Assessment  1.  Aortic root dilatation current status unknown.  2.  Hypertension.  Uncontrolled on current dose of lisinopril.  3.  Palpitations.    Recommendation Discussion  1.  I reviewed her home blood pressure log which shows well-controlled blood pressure consistently below 130 and mostly below 120 systolic.  2.  I reviewed her echocardiogram images with the patient.  3.  Holter monitor to evaluate palpitations.  4.  Encouraged diet high in magnesium and potassium.

## 2019-07-22 ENCOUNTER — NON-PROVIDER VISIT (OUTPATIENT)
Dept: CARDIOLOGY | Facility: MEDICAL CENTER | Age: 24
End: 2019-07-22
Payer: MEDICAID

## 2019-07-22 DIAGNOSIS — R00.0 SINUS TACHYCARDIA: ICD-10-CM

## 2019-07-22 DIAGNOSIS — R00.2 PALPITATIONS: ICD-10-CM

## 2019-07-22 PROCEDURE — 93224 XTRNL ECG REC UP TO 48 HRS: CPT | Performed by: INTERNAL MEDICINE

## 2019-08-01 ENCOUNTER — TELEPHONE (OUTPATIENT)
Dept: CARDIOLOGY | Facility: MEDICAL CENTER | Age: 24
End: 2019-08-01

## 2019-08-01 NOTE — TELEPHONE ENCOUNTER
Patient is long overdue to turn in her holter monitor.  It was placed on the 22nd of July, scheduled to be removed on the 23rd of July and returned shortly thereafter.  I have attempted several times to phone both her and her emergency contact, but the phones go to a [employer] wireless message that says the call cannot go through.

## 2019-08-15 ENCOUNTER — TELEPHONE (OUTPATIENT)
Dept: CARDIOLOGY | Facility: MEDICAL CENTER | Age: 24
End: 2019-08-15

## 2019-08-15 DIAGNOSIS — R00.2 PALPITATIONS: ICD-10-CM

## 2019-08-15 DIAGNOSIS — R42 DIZZINESS: ICD-10-CM

## 2019-08-15 DIAGNOSIS — R07.89 OTHER CHEST PAIN: ICD-10-CM

## 2019-08-15 LAB — EKG IMPRESSION: NORMAL

## 2019-08-15 NOTE — TELEPHONE ENCOUNTER
BRIANNA/Chanel    Pt reports she's missed a few doses of blood pressure medication this week. Also Reports she's been shaky and sweaty and had a nose bleed. States her b/p this morning is 128/91 with HR of 94. Please call pt to advise at [phone].

## 2019-08-15 NOTE — TELEPHONE ENCOUNTER
Contacted patient, she reports she missed some doses of her Lisinopril.  Patient is in the process of moving, packing only at this point.     Patient c/o consistent CP, but this has been happening since last OV.  Holter monitor was just read today yielding WNL results.     C/o dizziness with standing up.  She would feel her heart racing.  C/o edema in hands and feet and ankles that patient noticed last 4 days.     Denies SOB, difficulty breathing, weight gain.      /91 HR 94     Holter monitor results   Received: Today   Message Contents   Jaylen Bernardo M.D.  Chanel Pimentel, R.N.             Please let the patient know that her Holter monitor is completely normal.   If any major continuing problems with palpitations he did get an event recorder otherwise no additional recommendations and she she has no restriction   Thank you      Discussed SW recommendations of event recorder. Patient agreeable    In the event of new or worsening symptoms patient instructed to go to the emergency room.     Dino ordered    Task hand carried to

## 2019-08-19 ENCOUNTER — TELEPHONE (OUTPATIENT)
Dept: CARDIOLOGY | Facility: MEDICAL CENTER | Age: 24
End: 2019-08-19

## 2019-08-19 DIAGNOSIS — R00.2 PALPITATIONS: ICD-10-CM

## 2019-08-19 NOTE — TELEPHONE ENCOUNTER
BRIANNA Roth,    Pt has a zio patch ordered by Dr. Bernardo. Her insurance doesn't cover Zio's, but it does cover 14 day biotel. Can that test be ordered in the zio patches place please?    Thank you so much,    Michael

## 2019-08-22 ENCOUNTER — NON-PROVIDER VISIT (OUTPATIENT)
Dept: CARDIOLOGY | Facility: MEDICAL CENTER | Age: 24
End: 2019-08-22
Payer: MEDICAID

## 2019-08-22 ENCOUNTER — TELEPHONE (OUTPATIENT)
Dept: CARDIOLOGY | Facility: MEDICAL CENTER | Age: 24
End: 2019-08-22

## 2019-08-22 DIAGNOSIS — R00.2 PALPITATIONS: ICD-10-CM

## 2019-08-22 DIAGNOSIS — I10 ESSENTIAL HYPERTENSION: ICD-10-CM

## 2019-08-22 DIAGNOSIS — I49.3 PVC (PREMATURE VENTRICULAR CONTRACTION): ICD-10-CM

## 2019-08-22 DIAGNOSIS — R00.0 SINUS TACHYCARDIA: ICD-10-CM

## 2019-08-22 PROCEDURE — 93268 ECG RECORD/REVIEW: CPT | Performed by: INTERNAL MEDICINE

## 2019-08-22 NOTE — TELEPHONE ENCOUNTER
Patient enrolled in the [employer] Heart monitoring program.  >In office hookup with [facility] transmitted.  >Pending EOS.

## 2019-08-29 ENCOUNTER — TELEPHONE (OUTPATIENT)
Dept: CARDIOLOGY | Facility: MEDICAL CENTER | Age: 24
End: 2019-08-29

## 2019-08-29 DIAGNOSIS — I10 ESSENTIAL HYPERTENSION: ICD-10-CM

## 2019-08-29 DIAGNOSIS — R00.2 PALPITATIONS: ICD-10-CM

## 2019-08-29 DIAGNOSIS — Q24.9 CONGENITAL MALFORMATION OF HEART: ICD-10-CM

## 2019-08-29 NOTE — TELEPHONE ENCOUNTER
"SW    Pt reports she has been feeling \"off\" lately states blood pressure has been elevated with today being the highest at 155/109 just sitting.  She would please like a call back soon to find out what to do. Pt can be reached at [phone]     "

## 2019-08-30 RX ORDER — LISINOPRIL 5 MG/1
5 TABLET ORAL DAILY
Qty: 30 TAB | Refills: 6 | Status: SHIPPED | OUTPATIENT
Start: 2019-08-30 | End: 2020-08-03

## 2019-08-30 NOTE — TELEPHONE ENCOUNTER
"Contacted patient, She states Her BP elevated to 155/109 just sitting.  She went to [facility] last night due to left arm pain and cold sweats and feeling like a \"balloon was around her chest\"    At [facility] all tests were WNL.  No intervention for BP.     She does admit to some anxiety and she still is currently in the process of moving which is still stressful.    Advised if BP is >160 to take an extra Lisinopril until further advice from     Denies any other symptom    (Last echo showed normal aortic root for body surface area. Ascending aorta 3cm)      To SW to advise on HTN          "

## 2019-09-04 DIAGNOSIS — R00.0 SINUS TACHYCARDIA: ICD-10-CM

## 2019-09-04 DIAGNOSIS — R00.2 PALPITATIONS: ICD-10-CM

## 2019-09-04 DIAGNOSIS — I10 ESSENTIAL HYPERTENSION: ICD-10-CM

## 2019-09-04 NOTE — TELEPHONE ENCOUNTER
Discussed with SW.  Order Cardiac MRI for aortic root and ascending aorta measurement.  Increase Lisinopril to 10 mg.  Establish with PCP.  If MRI WNL patient to follow up with PCP for hypertension.      Contacted patient.  Discussed SW recommendations.  Patient advised to get a PCP established.  Patient reports Last /87.  Advised to continue 5 mg QD.  If BP sustains >130/80 patient to call clinic and Lisinopril 10 mg will be ordered.  Patient agreeable to all recommendations.     MRI ordered    Task hand carried to scheduling.

## 2019-09-05 DIAGNOSIS — I10 ESSENTIAL HYPERTENSION: ICD-10-CM

## 2019-09-05 DIAGNOSIS — I77.810 AORTIC ROOT DILATION (HCC): ICD-10-CM

## 2019-09-05 DIAGNOSIS — Q24.9 CONGENITAL MALFORMATION OF HEART: ICD-10-CM

## 2019-09-09 ENCOUNTER — TELEPHONE (OUTPATIENT)
Dept: CARDIOLOGY | Facility: MEDICAL CENTER | Age: 24
End: 2019-09-09

## 2019-09-09 NOTE — TELEPHONE ENCOUNTER
BRIANNA Buchanan,    Pt called and stated that she had just had 14 day Biotel and got a call back that she needed to do a KOH for 30 days. She wanted to know if Dr. Bernardo wanted to have this done. Please let me know and I will call her and schedule the appt.    Thank you so much,    Michael

## 2019-09-11 NOTE — TELEPHONE ENCOUNTER
Contacted patient.  Informed her that the second cardiac monitor does not need to be completed.  There were some conflicts with insurance and which type of holter monitor would be covered.  Results are still pending.

## 2019-09-12 ENCOUNTER — TELEPHONE (OUTPATIENT)
Dept: CARDIOLOGY | Facility: MEDICAL CENTER | Age: 24
End: 2019-09-12

## 2019-09-18 NOTE — TELEPHONE ENCOUNTER
Contacted patient.  Discussed cardiac event monitor results.  Now pending MRI scheduled on 10/9.  Patient denied any questions or concerns.

## 2019-09-23 DIAGNOSIS — I77.810 AORTIC ROOT DILATION (HCC): ICD-10-CM

## 2019-09-23 DIAGNOSIS — I77.810 AORTIC ROOT DILATATION (HCC): ICD-10-CM

## 2019-09-23 DIAGNOSIS — R06.02 SHORTNESS OF BREATH: ICD-10-CM

## 2019-09-23 DIAGNOSIS — R00.0 SINUS TACHYCARDIA: ICD-10-CM

## 2019-09-23 DIAGNOSIS — R42 DIZZINESS: ICD-10-CM

## 2019-09-23 DIAGNOSIS — I10 ESSENTIAL HYPERTENSION: ICD-10-CM

## 2019-09-23 DIAGNOSIS — R07.9 CHEST PAIN, UNSPECIFIED TYPE: ICD-10-CM

## 2019-10-03 ENCOUNTER — TELEPHONE (OUTPATIENT)
Dept: CARDIOLOGY | Facility: MEDICAL CENTER | Age: 24
End: 2019-10-03

## 2019-10-03 NOTE — TELEPHONE ENCOUNTER
----- Message -----  From: Jaylen Bernardo M.D.  Sent: 9/30/2019  12:40 PM PDT  To: Chanel Pimentel R.N.                      Please tell the patient that cardiac event recorder is unremarkable to explain her symptoms  Needs chest MRI.  Needs to get a PCP.

## 2019-10-09 ENCOUNTER — APPOINTMENT (OUTPATIENT)
Dept: RADIOLOGY | Facility: MEDICAL CENTER | Age: 24
End: 2019-10-09
Attending: INTERNAL MEDICINE
Payer: MEDICAID

## 2019-10-09 ENCOUNTER — TELEPHONE (OUTPATIENT)
Dept: CARDIOLOGY | Facility: MEDICAL CENTER | Age: 24
End: 2019-10-09

## 2019-10-09 DIAGNOSIS — I77.810 AORTIC ROOT DILATION (HCC): ICD-10-CM

## 2019-10-09 DIAGNOSIS — R00.0 SINUS TACHYCARDIA: ICD-10-CM

## 2019-10-09 DIAGNOSIS — I77.810 AORTIC ROOT DILATATION (HCC): ICD-10-CM

## 2019-10-09 DIAGNOSIS — R06.02 SHORTNESS OF BREATH: ICD-10-CM

## 2019-10-09 DIAGNOSIS — R07.9 CHEST PAIN, UNSPECIFIED TYPE: ICD-10-CM

## 2019-10-09 DIAGNOSIS — R42 DIZZINESS: ICD-10-CM

## 2019-10-09 NOTE — TELEPHONE ENCOUNTER
BRIANNA Hernandez (MRI Tech) called from [facility] about patient's MRI. Patient is scheduled to come in tomorrow at 5:30pm. He needs to know if the MRI of the chest is for cardiac, he said if it is, the time is too late. He would like a call back at [phone].

## 2019-10-09 NOTE — TELEPHONE ENCOUNTER
Called David back at ext. [phone]. He states pt's MRI needs to be reordered and rescheduled due to late time slot. Per states needs to be scheduled prior to 4pm. MRI reordered. Called pt to let her know. Pt given phone number to imaging schedulers at [phone] and transferred phone call to ext. [phone].

## 2019-10-11 DIAGNOSIS — R07.9 CHEST PAIN, UNSPECIFIED TYPE: ICD-10-CM

## 2019-10-11 NOTE — TELEPHONE ENCOUNTER
Varinder from MRI (ext. [phone]) called to clarify MRI order again. Confirmed with NELIDA Buchanan. Cardiac MRI needed for pt. MRA-chest ordered per  recommendations. LM for Varinder at ext. [phone].

## 2019-10-15 DIAGNOSIS — I77.810 AORTIC ROOT DILATATION (HCC): ICD-10-CM

## 2019-10-15 DIAGNOSIS — I71.20 THORACIC AORTIC ANEURYSM WITHOUT RUPTURE (HCC): ICD-10-CM

## 2019-11-02 ENCOUNTER — APPOINTMENT (OUTPATIENT)
Dept: RADIOLOGY | Facility: MEDICAL CENTER | Age: 24
End: 2019-11-02
Attending: INTERNAL MEDICINE
Payer: MEDICAID

## 2019-11-10 ENCOUNTER — HOSPITAL ENCOUNTER (OUTPATIENT)
Dept: RADIOLOGY | Facility: MEDICAL CENTER | Age: 24
End: 2019-11-10
Attending: INTERNAL MEDICINE
Payer: MEDICAID

## 2019-11-10 DIAGNOSIS — I77.810 AORTIC ROOT DILATATION (HCC): ICD-10-CM

## 2019-11-10 DIAGNOSIS — I71.20 THORACIC AORTIC ANEURYSM WITHOUT RUPTURE (HCC): ICD-10-CM

## 2019-11-10 PROCEDURE — C8911 MRA W/O FOL W/CONT, CHEST: HCPCS

## 2019-11-10 PROCEDURE — 700117 HCHG RX CONTRAST REV CODE 255: Performed by: INTERNAL MEDICINE

## 2019-11-10 PROCEDURE — A9576 INJ PROHANCE MULTIPACK: HCPCS | Performed by: INTERNAL MEDICINE

## 2019-11-10 RX ADMIN — GADOTERIDOL 13 ML: 279.3 INJECTION, SOLUTION INTRAVENOUS at 11:29

## 2019-11-12 ENCOUNTER — TELEPHONE (OUTPATIENT)
Dept: CARDIOLOGY | Facility: MEDICAL CENTER | Age: 24
End: 2019-11-12

## 2019-11-13 NOTE — TELEPHONE ENCOUNTER
IMPRESSION:     MRA of the thoracic aorta within normal limits. No aneurysmal dilatation or evidence of dissection.    ____________    Contacted patient, discussed normal MRI/MRA.  Patient denies any questions or concerns.

## 2020-02-24 ENCOUNTER — TELEPHONE (OUTPATIENT)
Dept: SCHEDULING | Facility: IMAGING CENTER | Age: 25
End: 2020-02-24

## 2020-02-26 ENCOUNTER — APPOINTMENT (OUTPATIENT)
Dept: CARDIOLOGY | Facility: MEDICAL CENTER | Age: 25
End: 2020-02-26
Payer: MEDICAID

## 2020-03-10 ENCOUNTER — OFFICE VISIT (OUTPATIENT)
Dept: URGENT CARE | Facility: CLINIC | Age: 25
End: 2020-03-10
Payer: MEDICAID

## 2020-03-10 ENCOUNTER — TELEPHONE (OUTPATIENT)
Dept: SCHEDULING | Facility: IMAGING CENTER | Age: 25
End: 2020-03-10

## 2020-03-10 ENCOUNTER — DOCUMENTATION (OUTPATIENT)
Dept: SCHEDULING | Facility: IMAGING CENTER | Age: 25
End: 2020-03-10

## 2020-03-10 VITALS
OXYGEN SATURATION: 98 % | TEMPERATURE: 98.3 F | HEART RATE: 78 BPM | SYSTOLIC BLOOD PRESSURE: 128 MMHG | DIASTOLIC BLOOD PRESSURE: 84 MMHG

## 2020-03-10 DIAGNOSIS — J22 LOWER RESPIRATORY INFECTION: ICD-10-CM

## 2020-03-10 DIAGNOSIS — R19.7 DIARRHEA, UNSPECIFIED TYPE: ICD-10-CM

## 2020-03-10 PROCEDURE — 99214 OFFICE O/P EST MOD 30 MIN: CPT | Performed by: PHYSICIAN ASSISTANT

## 2020-03-10 RX ORDER — ONDANSETRON 4 MG/1
4 TABLET, ORALLY DISINTEGRATING ORAL EVERY 6 HOURS PRN
Qty: 10 TAB | Refills: 0 | Status: SHIPPED | OUTPATIENT
Start: 2020-03-10 | End: 2023-07-31

## 2020-03-10 RX ORDER — DOXYCYCLINE HYCLATE 100 MG/1
100 CAPSULE ORAL 2 TIMES DAILY
Qty: 14 EACH | Refills: 0 | Status: SHIPPED | OUTPATIENT
Start: 2020-03-10 | End: 2020-03-17

## 2020-03-10 RX ORDER — BENZONATATE 200 MG/1
200 CAPSULE ORAL 3 TIMES DAILY PRN
Qty: 30 CAP | Refills: 0 | Status: SHIPPED | OUTPATIENT
Start: 2020-03-10 | End: 2023-07-31

## 2020-03-10 ASSESSMENT — ENCOUNTER SYMPTOMS
SPUTUM PRODUCTION: 0
COUGH: 1
DIARRHEA: 1
CHILLS: 0
FEVER: 0
WHEEZING: 0
PALPITATIONS: 0
HEMOPTYSIS: 0
SHORTNESS OF BREATH: 0
SORE THROAT: 1

## 2020-03-10 NOTE — TELEPHONE ENCOUNTER
"Chronic HTN. Heart condition. unconfirmed breast lump.       1. Caller Name: Keiko [place]                        Call Back Number:   [facility] PCP or Specialty Provider: Yes establishing w/ new provider, Sofiya Grant.         2.  Does patient have any active symptoms of respiratory illness (fever OR cough OR shortness of breath)? Yes, the patient reports the following respiratory symptoms: cough and shortness of breath. SOB is normal, no worse.   N/V/D for a couple of weeks.      3.  In the last 30 days, has the patient traveled outside of the country OR in a high risk area within the [place] OR have any known contact with someone who has?  Not asked. Please see note.      4.  Does patient have any comoribidities?    Pt states \"Chronic HTN. Heart condition. Unconfirmed breast lump.\"     5. Disposition:  POTENTIAL PUI (LOW): Patient was advised to perform self care, monitor for worsening symptoms and to call back in 3 days if no improvement.  and   Will provide rescheduling for 2 wks from now. Pt is concerned about missing work and not having Rx for her HTN.       Note routed to PCP: Provider action needed: Please contact pt with concerns about Rx for HTN and missing work    "

## 2020-03-10 NOTE — TELEPHONE ENCOUNTER
"Pt was connected to me via scheduling because she was not comfortable continuing the call. I took the call over. The pt was upset that her appointment to establish care was canceled r/t a cough. She was advised by the clinic to go to [facility] to be \"tested for the coronavirus\". The pt stated that she was not tested and the provider did nothing but \"give her nausea medication for no reason\" the pt then stated \"I have had a little flu for a couple of weeks with diarrhea and nausea\". I clarified that [facility] would not be testing her for coronavirus unless they thought she was high risk and they would contact [facility] for further action. I also clarified that we are doing our best to protect our patients, the community, and our staff, and the steps taken to cancel/reschedule were appropriate.     The pt continued by stating that she has never felt so disgusting after leaving a doctor's office. I asked for her to clarify what she meant and she stated \" it was disgusting. There was hair on everything. The tools they use to check the patients were on the floor, and I saw a cockroach run out from behind the garbage can.\"        This patient is concerned that she will not have the medication she needs for her HTN, and she will loose her job. I did recommend that she stay home until her V/D resolved. I offered to reschedule this pt for 2 weeks out. She stated \"yeah that's fine, but I think I am going to have to talk to my  about this. I'm going to have to contact our \". I then asked if she would like to reschedule her appointment. She said she would like that and I transferred her to the  in our room.              "

## 2020-03-10 NOTE — PROGRESS NOTES
Subjective:      Keiko Hawkins Us is a 24 y.o. female who presents with Cough ([id number], nausea, diarrhea off and on)            Cough   This is a new problem. The current episode started in the past 7 days. The problem has been unchanged. The cough is non-productive. Associated symptoms include a sore throat. Pertinent negatives include no chest pain, chills, ear pain, fever, hemoptysis, shortness of breath or wheezing. Nothing aggravates the symptoms. She has tried nothing for the symptoms.       Review of Systems   Constitutional: Positive for malaise/fatigue. Negative for chills and fever.   HENT: Positive for congestion and sore throat. Negative for ear pain.    Respiratory: Positive for cough. Negative for hemoptysis, sputum production, shortness of breath and wheezing.    Cardiovascular: Negative for chest pain and palpitations.   Gastrointestinal: Positive for diarrhea.   All other systems reviewed and are negative.    PMH:  has a past medical history of Anxiety, Depression, Head ache, and Hypertension.  MEDS:   Current Outpatient Medications:   •  benzonatate (TESSALON) 200 MG capsule, Take 1 Cap by mouth 3 times a day as needed for Cough., Disp: 30 Cap, Rfl: 0  •  doxycycline (VIBRAMYCIN) 100 MG Cap, Take 1 Cap by mouth 2 times a day for 7 days., Disp: 14 Each, Rfl: 0  •  ondansetron (ZOFRAN ODT) 4 MG TABLET DISPERSIBLE, Take 1 Tab by mouth every 6 hours as needed., Disp: 10 Tab, Rfl: 0  •  lisinopril (PRINIVIL) 5 MG Tab, Take 1 Tab by mouth every day. (Patient not taking: Reported on 3/10/2020), Disp: 30 Tab, Rfl: 6  ALLERGIES: No Known Allergies  SURGHX:   Past Surgical History:   Procedure Laterality Date   • GYN SURGERY      c/s   • PRIMARY C SECTION       SOCHX:  reports that she has quit smoking. She has a 5.00 pack-year smoking history. She has never used smokeless tobacco. She reports current drug use. Drug: Marijuana. She reports that she does not drink alcohol.  FH: Family history was  reviewed, no pertinent findings to report  Medications, Allergies, and current problem list reviewed today in Epic       Objective:     Blood Pressure 128/84   Pulse 78   Temperature 36.8 °C (98.3 °F) (Temporal)   Oxygen Saturation 98%      Physical Exam  Vitals signs reviewed.   Constitutional:       General: She is not in acute distress.     Appearance: She is well-developed. She is not ill-appearing or toxic-appearing.      Interventions: She is not intubated.  HENT:      Head: Normocephalic and atraumatic.      Right Ear: Hearing, tympanic membrane, ear canal and external ear normal.      Left Ear: Hearing, tympanic membrane, ear canal and external ear normal.      Nose: Nose normal.      Mouth/Throat:      Pharynx: Uvula midline.   Eyes:      General: Lids are normal.      Conjunctiva/sclera: Conjunctivae normal.   Neck:      Musculoskeletal: Full passive range of motion without pain, normal range of motion and neck supple.   Cardiovascular:      Rate and Rhythm: Regular rhythm.      Heart sounds: Normal heart sounds, S1 normal and S2 normal. No murmur. No friction rub. No gallop.    Pulmonary:      Effort: Pulmonary effort is normal. No tachypnea, bradypnea, accessory muscle usage or respiratory distress. She is not intubated.      Breath sounds: Normal breath sounds. No decreased breath sounds, wheezing, rhonchi or rales.   Chest:      Chest wall: No tenderness.   Abdominal:      Tenderness: There is no abdominal tenderness. There is no guarding.   Musculoskeletal: Normal range of motion.   Skin:     General: Skin is warm and dry.   Neurological:      Mental Status: She is alert and oriented to person, place, and time.   Psychiatric:         Speech: Speech normal.         Behavior: Behavior normal.         Thought Content: Thought content normal.         Judgment: Judgment normal.                 Assessment/Plan:   Discussed symptoms are likely a result of a viral process.  Contingent antibiotic  prescription given to patient to fill upon meeting criteria of guidelines discussed.       1. Lower respiratory infection    - benzonatate (TESSALON) 200 MG capsule; Take 1 Cap by mouth 3 times a day as needed for Cough.  Dispense: 30 Cap; Refill: 0  - doxycycline (VIBRAMYCIN) 100 MG Cap; Take 1 Cap by mouth 2 times a day for 7 days.  Dispense: 14 Each; Refill: 0    2. Diarrhea, unspecified type  - brat diet    Differential diagnosis, natural history, supportive care discussed. Follow-up with primary care provider within 7-10 days, emergency room precautions discussed.  Patient and/or family appears understanding of information.  Handout and review of patients diagnosis and treatment was discussed extensively.     - ondansetron (ZOFRAN ODT) 4 MG TABLET DISPERSIBLE; Take 1 Tab by mouth every 6 hours as needed.  Dispense: 10 Tab; Refill: 0

## 2020-03-10 NOTE — PATIENT INSTRUCTIONS
Food Choices to Help Relieve Diarrhea, Adult  When you have diarrhea, the foods you eat and your eating habits are very important. Choosing the right foods and drinks can help relieve diarrhea. Also, because diarrhea can last up to 7 days, you need to replace lost fluids and electrolytes (such as sodium, potassium, and chloride) in order to help prevent dehydration.   WHAT GENERAL GUIDELINES DO I NEED TO FOLLOW?  · Slowly drink 1 cup (8 oz) of fluid for each episode of diarrhea. If you are getting enough fluid, your urine will be clear or pale yellow.  · Eat starchy foods. Some good choices include white rice, white toast, pasta, low-fiber cereal, baked potatoes (without the skin), saltine crackers, and bagels.  · Avoid large servings of any cooked vegetables.  · Limit fruit to two servings per day. A serving is ½ cup or 1 small piece.  · Choose foods with less than 2 g of fiber per serving.  · Limit fats to less than 8 tsp (38 g) per day.  · Avoid fried foods.  · Eat foods that have probiotics in them. Probiotics can be found in certain dairy products.  · Avoid foods and beverages that may increase the speed at which food moves through the stomach and intestines (gastrointestinal tract). Things to avoid include:  ¨ High-fiber foods, such as dried fruit, raw fruits and vegetables, nuts, seeds, and whole grain foods.  ¨ Spicy foods and high-fat foods.  ¨ Foods and beverages sweetened with high-fructose corn syrup, honey, or sugar alcohols such as xylitol, sorbitol, and mannitol.  WHAT FOODS ARE RECOMMENDED?  Grains  White rice. White, Hungarian, or bia breads (fresh or toasted), including plain rolls, buns, or bagels. White pasta. Saltine, soda, or bernardo crackers. Pretzels. Low-fiber cereal. Cooked cereals made with water (such as cornmeal, farina, or cream cereals). Plain muffins. Matzo. Chasity toast. Zwieback.   Vegetables  Potatoes (without the skin). Strained tomato and vegetable juices. Most well-cooked and canned  vegetables without seeds. Tender lettuce.  Fruits  Cooked or canned applesauce, apricots, cherries, fruit cocktail, grapefruit, peaches, pears, or plums. Fresh bananas, apples without skin, cherries, grapes, cantaloupe, grapefruit, peaches, oranges, or plums.   Meat and Other Protein Products  Baked or boiled chicken. Eggs. Tofu. Fish. Seafood. Smooth peanut butter. Ground or well-cooked tender beef, ham, veal, lamb, pork, or poultry.   Dairy  Plain yogurt, kefir, and unsweetened liquid yogurt. Lactose-free milk, buttermilk, or soy milk. Plain hard cheese.  Beverages  Sport drinks. Clear broths. Diluted fruit juices (except prune). Regular, caffeine-free sodas such as ginger ale. Water. Decaffeinated teas. Oral rehydration solutions. Sugar-free beverages not sweetened with sugar alcohols.  Other  Bouillon, broth, or soups made from recommended foods.   The items listed above may not be a complete list of recommended foods or beverages. Contact your dietitian for more options.  WHAT FOODS ARE NOT RECOMMENDED?  Grains  Whole grain, whole wheat, bran, or rye breads, rolls, pastas, crackers, and cereals. Wild or brown rice. Cereals that contain more than 2 g of fiber per serving. Corn tortillas or taco shells. Cooked or dry oatmeal. Granola. Popcorn.  Vegetables  Raw vegetables. Cabbage, broccoli, [place] sprouts, artichokes, baked beans, beet greens, corn, kale, legumes, peas, sweet potatoes, and yams. Potato skins. Cooked spinach and cabbage.  Fruits  Dried fruit, including raisins and dates. Raw fruits. Stewed or dried prunes. Fresh apples with skin, apricots, mangoes, pears, raspberries, and strawberries.   Meat and Other Protein Products  [place] peanut butter. Nuts and seeds. Beans and lentils. Neff.   Dairy  High-fat cheeses. Milk, chocolate milk, and beverages made with milk, such as milk shakes. Cream. Ice cream.  Sweets and Desserts  Sweet rolls, doughnuts, and sweet breads. Pancakes and waffles.  Fats and  Oils  Butter. Cream sauces. Margarine. Salad oils. Plain salad dressings. Olives. Avocados.   Beverages  Caffeinated beverages (such as coffee, tea, soda, or energy drinks). Alcoholic beverages. Fruit juices with pulp. Prune juice. Soft drinks sweetened with high-fructose corn syrup or sugar alcohols.  Other  Coconut. Hot sauce. Chili powder. Mayonnaise. Gravy. Cream-based or milk-based soups.   The items listed above may not be a complete list of foods and beverages to avoid. Contact your dietitian for more information.  WHAT SHOULD I DO IF I BECOME DEHYDRATED?  Diarrhea can sometimes lead to dehydration. Signs of dehydration include dark urine and dry mouth and skin. If you think you are dehydrated, you should rehydrate with an oral rehydration solution. These solutions can be purchased at pharmacies, retail stores, or online.   Drink ½-1 cup (120-240 mL) of oral rehydration solution each time you have an episode of diarrhea. If drinking this amount makes your diarrhea worse, try drinking smaller amounts more often. For example, drink 1-3 tsp (5-15 mL) every 5-10 minutes.   A general rule for staying hydrated is to drink 1½-2 L of fluid per day. Talk to your health care provider about the specific amount you should be drinking each day. Drink enough fluids to keep your urine clear or pale yellow.     This information is not intended to replace advice given to you by your health care provider. Make sure you discuss any questions you have with your health care provider.     Document Released: 03/09/2005 Document Revised: 01/08/2016 Document Reviewed: 11/10/2014  [employer] Interactive Patient Education ©2016 [employer]
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
## 2020-03-10 NOTE — LETTER
March 10, 2020         Patient: Keiko Us   YOB: 1995   Date of Visit: 3/10/2020           To Whom it May Concern:    Keiko Us was seen in my clinic on 3/10/2020. Please excuse her from work 3/7-3/10/2020.    If you have any questions or concerns, please don't hesitate to call.        Sincerely,           Cy Mcmahan P.A.-C.  Electronically Signed

## 2020-04-28 ENCOUNTER — TELEPHONE (OUTPATIENT)
Dept: SCHEDULING | Facility: IMAGING CENTER | Age: 25
End: 2020-04-28

## 2020-04-28 ENCOUNTER — TELEPHONE (OUTPATIENT)
Dept: HEALTH INFORMATION MANAGEMENT | Facility: OTHER | Age: 25
End: 2020-04-28

## 2020-04-28 NOTE — TELEPHONE ENCOUNTER
1. Caller Name: Keiko Us                      Call Back Number: [phone]  [facility] PCP or Specialty Provider: No          2.  Does patient have any active symptoms of respiratory illness? Yes, the patient reports the following respiratory symptoms: cough, shortness of breath or difficulty breathing, sore throat, headache and new loss of taste or smell.  Patient has multiple other complaints breast lump that comes and goes, hair loss, memory probs and anxiety, palpitations and irregular heartbeat x 1 week though I see in her record she's being followed by cardiology, states she's been having shiny white sharp shooting spots in her vision. In addition to HA.    3.  Does patient have any comoribidities? None Patient endorses cardiac hx dilate aorta but unsure of actual dx.     4.  Has the patient traveled in the last 14 days OR had any known contact with someone who is suspected or confirmed to have COVID-19?  No.    5. Disposition: Advised to go to ED or call 9-1-1    Note routed to [facility] Provider: FYI only.

## 2020-06-03 ENCOUNTER — OFFICE VISIT (OUTPATIENT)
Dept: MEDICAL GROUP | Facility: MEDICAL CENTER | Age: 25
End: 2020-06-03
Attending: PHYSICIAN ASSISTANT
Payer: MEDICAID

## 2020-06-03 VITALS
TEMPERATURE: 99.7 F | DIASTOLIC BLOOD PRESSURE: 74 MMHG | WEIGHT: 148.4 LBS | HEART RATE: 78 BPM | BODY MASS INDEX: 23.85 KG/M2 | SYSTOLIC BLOOD PRESSURE: 126 MMHG | HEIGHT: 66 IN | OXYGEN SATURATION: 98 %

## 2020-06-03 DIAGNOSIS — I10 ESSENTIAL HYPERTENSION: Chronic | ICD-10-CM

## 2020-06-03 DIAGNOSIS — N64.4 BREAST PAIN: ICD-10-CM

## 2020-06-03 DIAGNOSIS — R41.3 MEMORY DIFFICULTIES: ICD-10-CM

## 2020-06-03 DIAGNOSIS — M79.671 RIGHT FOOT PAIN: ICD-10-CM

## 2020-06-03 DIAGNOSIS — L30.8 OTHER ECZEMA: ICD-10-CM

## 2020-06-03 DIAGNOSIS — L70.0 ACNE VULGARIS: ICD-10-CM

## 2020-06-03 PROBLEM — R21 RASH: Status: ACTIVE | Noted: 2020-06-03

## 2020-06-03 PROCEDURE — 99204 OFFICE O/P NEW MOD 45 MIN: CPT | Performed by: PHYSICIAN ASSISTANT

## 2020-06-03 PROCEDURE — 99213 OFFICE O/P EST LOW 20 MIN: CPT | Performed by: PHYSICIAN ASSISTANT

## 2020-06-03 RX ORDER — CLINDAMYCIN AND BENZOYL PEROXIDE 10; 50 MG/G; MG/G
GEL TOPICAL
Qty: 50 G | Refills: 0 | Status: SHIPPED | OUTPATIENT
Start: 2020-06-03 | End: 2023-07-31

## 2020-06-03 RX ORDER — LISINOPRIL 10 MG/1
5 TABLET ORAL
COMMUNITY
Start: 2020-05-12 | End: 2020-06-03

## 2020-06-03 SDOH — HEALTH STABILITY: MENTAL HEALTH: HOW OFTEN DO YOU HAVE A DRINK CONTAINING ALCOHOL?: MONTHLY OR LESS

## 2020-06-03 SDOH — HEALTH STABILITY: MENTAL HEALTH: HOW MANY STANDARD DRINKS CONTAINING ALCOHOL DO YOU HAVE ON A TYPICAL DAY?: 1 OR 2

## 2020-06-03 ASSESSMENT — ENCOUNTER SYMPTOMS
SHORTNESS OF BREATH: 0
CHILLS: 0
WEIGHT LOSS: 0
PHOTOPHOBIA: 0
BLOOD IN STOOL: 0
DIZZINESS: 1
NAUSEA: 0
SINUS PAIN: 0
CLAUDICATION: 0
WHEEZING: 0
CONSTIPATION: 0
FEVER: 0
INSOMNIA: 1
VOMITING: 0
NERVOUS/ANXIOUS: 1
WEAKNESS: 1
EYE PAIN: 0
PALPITATIONS: 0
DEPRESSION: 1
BLURRED VISION: 0
COUGH: 0
HEADACHES: 1
TINGLING: 0
DIARRHEA: 0
DOUBLE VISION: 0

## 2020-06-03 ASSESSMENT — PATIENT HEALTH QUESTIONNAIRE - PHQ9
SUM OF ALL RESPONSES TO PHQ QUESTIONS 1-9: 13
5. POOR APPETITE OR OVEREATING: 1 - SEVERAL DAYS
CLINICAL INTERPRETATION OF PHQ2 SCORE: 2
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
## 2020-06-03 NOTE — ASSESSMENT & PLAN NOTE
"Keiko presents today to establish care.  She reports a history of right foot pain.  The pain is located in between the third and fourth digit.  She reports that it is worse when she is active.  She describes it as her \"bone rubbing over the top of the other bone\".  She reports associated occasional numbness and tingling to the area.  The pain is made worse when she wears narrow shoes.  She has tried wearing shoes with soft inserts and has not had no relief.  She would like for this to be further evaluated.  "

## 2020-06-03 NOTE — ASSESSMENT & PLAN NOTE
This is a chronic condition.  Risk Factors: FMHx.  Current Meds: Lisinopril 5 mg daily.  Side effects: None.  Home BP Log: He does not monitor blood pressure at home.  Associated symptoms: No chest pain, shortness of breath, headaches, dizziness/lightheadedness.
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
## 2020-06-03 NOTE — ASSESSMENT & PLAN NOTE
Keiko reports left breast pain for 4 months now.  She describes the pain as intermittent and sharp with radiation down the arm and into the hand.  She reports that the pain will typically last anywhere from 1 to 10 minutes and then resolved.  However she does report that she started feeling a dull achy pain that she rates a 5/10 at baseline and rates her pain a 10/10 when it is at its worse.  She denies any difficulty with breathing or shortness of breath.

## 2020-06-04 NOTE — ASSESSMENT & PLAN NOTE
Keiko has a history of acne on the upper arms and chest.  She has tried over-the-counter acne products without resolution.  She reports that she typically does pick at the pimples and popped them in which they end up getting infected and scar over.  She is hoping there is another alternative for her to try to help with this.

## 2020-06-04 NOTE — ASSESSMENT & PLAN NOTE
Keiko has a history of eczema.  She reports constant dryness of the forearms,hands and fingers.  She has tried several different lotions without resolution.  She states that she only applies the lotion when she has severe flare-ups.  She reports associated itchiness when the skin is extremely dry.

## 2020-06-05 ENCOUNTER — APPOINTMENT (OUTPATIENT)
Dept: RADIOLOGY | Facility: MEDICAL CENTER | Age: 25
End: 2020-06-05
Attending: PHYSICIAN ASSISTANT
Payer: MEDICAID

## 2020-06-05 DIAGNOSIS — R41.3 MEMORY DIFFICULTIES: ICD-10-CM

## 2020-06-05 PROCEDURE — 70551 MRI BRAIN STEM W/O DYE: CPT

## 2020-06-08 NOTE — RESULT ENCOUNTER NOTE
Results have been reviewed and released to [employer].  Benign MRI of the brain. No concerning etiology was found. We will have her follow up with Neurology for further evaluation.

## 2020-06-09 ENCOUNTER — HOSPITAL ENCOUNTER (OUTPATIENT)
Dept: RADIOLOGY | Facility: MEDICAL CENTER | Age: 25
End: 2020-06-09
Payer: MEDICAID

## 2020-06-15 ENCOUNTER — APPOINTMENT (OUTPATIENT)
Dept: RADIOLOGY | Facility: MEDICAL CENTER | Age: 25
End: 2020-06-15
Attending: PHYSICIAN ASSISTANT
Payer: MEDICAID

## 2020-07-02 ENCOUNTER — OFFICE VISIT (OUTPATIENT)
Dept: CARDIOLOGY | Facility: MEDICAL CENTER | Age: 25
End: 2020-07-02
Payer: MEDICAID

## 2020-07-02 VITALS
HEIGHT: 66 IN | DIASTOLIC BLOOD PRESSURE: 70 MMHG | WEIGHT: 146 LBS | SYSTOLIC BLOOD PRESSURE: 116 MMHG | BODY MASS INDEX: 23.46 KG/M2 | HEART RATE: 98 BPM | OXYGEN SATURATION: 100 %

## 2020-07-02 DIAGNOSIS — R00.2 PALPITATIONS: ICD-10-CM

## 2020-07-02 PROCEDURE — 99214 OFFICE O/P EST MOD 30 MIN: CPT | Performed by: INTERNAL MEDICINE

## 2020-07-02 RX ORDER — LISINOPRIL 10 MG/1
10 TABLET ORAL DAILY
COMMUNITY
End: 2020-08-03

## 2020-07-02 ASSESSMENT — ENCOUNTER SYMPTOMS
COUGH: 0
LOSS OF CONSCIOUSNESS: 0
MYALGIAS: 0
DIZZINESS: 0
SHORTNESS OF BREATH: 0
PALPITATIONS: 0

## 2020-07-02 NOTE — PROGRESS NOTES
Aortic root dilatation      Subjective:   Keiko Us is a 24 y.o. female who presents today referred for follow-up evaluation of hyper tension and aortic root dilation.    Last seen on 7/5/2019.    Since 7/5/2019 the patient had a cardiac MRI which shows a completely normal a sending aorta with no evidence of aortic root dilatation.  Echocardiogram also showed no evidence of aortic root dilatation with normal cardiac and valve function.  In February or March 2020 the patient went to [facility] ER for various symptoms that she describes to me including heart pounding 4-5 times a day while at work, symptoms of lightheadedness, fatigue, arm numbness and toe tingling, also she has had memory problems.  She also describes some symptoms of orthostatic lightheadedness.     Since 3/8/2019 appointment the patient has noted daily palpitations without lightheadedness or syncope.  Has some orthostatic dizziness.  Compliant with lisinopril and monitoring blood pressure.  Is maintaining a home blood pressure log.    The patient reports a history of hypertension diagnosed at age 13 treated with lisinopril except during her 3 pregnancies in which she was apparently transitioned to labetalol.    Last delivery was 4 months ago and was started back on lisinopril 5 mg daily.    The patient does not monitor her blood pressure.    The patient was formally last seen as a patient by Dr. Amaya 2 years ago but recently was in his clinic with her 4-year-old daughter and it was strongly suggested that she secure a cardiology appointment.    The patient has been under a lot of stress.    The patient denies chest pain or shortness of breath.    Has been under the care of Dr. Parks, high risk OB  Last delivery [facility].    Social history  Single.  3 children 6-year-old, 4-year-old and 4-month-old.    Past Medical History:   Diagnosis Date   • Anxiety    • Depression    • Head ache    • Hypertension      Past Surgical  History:   Procedure Laterality Date   • GYN SURGERY      c/s x3   • PRIMARY C SECTION       Family History   Problem Relation Age of Onset   • Alcohol/Drug Mother    • Hypertension Father    • Arthritis Maternal Grandmother    • Cancer Maternal Grandfather         skin cancer    • Hypertension Maternal Grandfather    • Hypertension Paternal Grandmother    • Heart Disease Paternal Grandfather    • Hypertension Paternal Grandfather      Social History     Socioeconomic History   • Marital status: Single     Spouse name: Not on file   • Number of children: Not on file   • Years of education: Not on file   • Highest education level: Not on file   Occupational History   • Not on file   Social Needs   • Financial resource strain: Not on file   • Food insecurity     Worry: Not on file     Inability: Not on file   • Transportation needs     Medical: Not on file     Non-medical: Not on file   Tobacco Use   • Smoking status: Former Smoker     Packs/day: 1.00     Years: 5.00     Pack years: 5.00     Types: Cigarettes   • Smokeless tobacco: Never Used   • Tobacco comment: quit 3 years ago.    Substance and Sexual Activity   • Alcohol use: Not Currently     Frequency: Monthly or less     Drinks per session: 1 or 2   • Drug use: Yes     Types: Marijuana     Comment: 1-2 times a day -4gm   • Sexual activity: Yes     Partners: Male     Birth control/protection: Surgical   Lifestyle   • Physical activity     Days per week: Not on file     Minutes per session: Not on file   • Stress: Not on file   Relationships   • Social connections     Talks on phone: Not on file     Gets together: Not on file     Attends Scientologist service: Not on file     Active member of club or organization: Not on file     Attends meetings of clubs or organizations: Not on file     Relationship status: Not on file   • Intimate partner violence     Fear of current or ex partner: Not on file     Emotionally abused: Not on file     Physically abused: Not on file  "    Forced sexual activity: Not on file   Other Topics Concern   • Not on file   Social History Narrative   • Not on file     No Known Allergies  Outpatient Encounter Medications as of 7/2/2020   Medication Sig Dispense Refill   • lisinopril (PRINIVIL) 10 MG Tab Take 10 mg by mouth every day.     • clindamycin-benzoyl peroxide (BENZACLIN) gel Apply to affected area once nightly. (Patient not taking: Reported on 7/2/2020) 50 g 0   • benzonatate (TESSALON) 200 MG capsule Take 1 Cap by mouth 3 times a day as needed for Cough. (Patient not taking: Reported on 7/2/2020) 30 Cap 0   • ondansetron (ZOFRAN ODT) 4 MG TABLET DISPERSIBLE Take 1 Tab by mouth every 6 hours as needed. (Patient not taking: Reported on 7/2/2020) 10 Tab 0   • lisinopril (PRINIVIL) 5 MG Tab Take 1 Tab by mouth every day. (Patient not taking: Reported on 3/10/2020) 30 Tab 6     No facility-administered encounter medications on file as of 7/2/2020.      Review of Systems   Respiratory: Negative for cough and shortness of breath.    Cardiovascular: Negative for chest pain and palpitations.   Musculoskeletal: Negative for myalgias.   Neurological: Negative for dizziness and loss of consciousness.        Objective:   /70 (BP Location: Left arm, Patient Position: Sitting, BP Cuff Size: Adult)   Pulse 98   Ht 1.676 m (5' 6\")   Wt 66.2 kg (146 lb)   SpO2 100%   BMI 23.57 kg/m²     Physical Exam   Constitutional: She is oriented to person, place, and time. She appears well-developed and well-nourished.   Eyes: Pupils are equal, round, and reactive to light. Conjunctivae are normal.   Neck: Normal range of motion. Neck supple. No JVD present.   Cardiovascular: Normal rate, regular rhythm, normal heart sounds and intact distal pulses.   Pulmonary/Chest: Effort normal and breath sounds normal. No accessory muscle usage. No respiratory distress. She has no wheezes. She has no rales.   Musculoskeletal:         General: No edema.   Neurological: She is " alert and oriented to person, place, and time.   Skin: Skin is warm, dry and intact. No rash noted. No cyanosis. Nails show no clubbing.   Psychiatric: She has a normal mood and affect. Her behavior is normal.     EKG 03/08/2019 normal sinus rhythm, rate 73.  Within normal limits.  Reviewed by myself.    ECHOCARDIOGRAM 03/26/2019  Normal left ventricular systolic function.  Left ventricular ejection fraction is visually estimated to be 65%.  Unable to estimate pulmonary artery pressure due to an inadequate   tricuspid regurgitant jet.  No valve abnormalities.   No prior study is available for comparison.    Assessment:     No diagnosis found.    Medical Decision Making:  Today's Assessment / Status / Plan:     Assessment  1.  History of aortic root dilatation with normal cardiac chest MRA.  2.  Hypertension.  Controlled on current dose of lisinopril.  3.  Palpitations.  4.  Fatigue.  5.  Memory problems.  6.  Anxiety/depression.  7.  Chronic marijuana use.    Recommendation Discussion  1.  It is unclear as to what is causing the patient's various symptoms.  2.  There is no real evidence of ongoing heart disease with unremarkable Holter monitor, normal echocardiogram, normal chest MRA.  3.  Some of her symptoms seem to be that of orthostatic low blood pressure.  4.  She saw a general Dr. Kamla Frias in [place] and I will get records from her office as well as [facility] records.

## 2020-07-21 ENCOUNTER — NON-PROVIDER VISIT (OUTPATIENT)
Dept: CARDIOLOGY | Facility: MEDICAL CENTER | Age: 25
End: 2020-07-21
Payer: MEDICAID

## 2020-07-21 DIAGNOSIS — R00.2 PALPITATIONS: ICD-10-CM

## 2020-07-21 DIAGNOSIS — R00.0 SINUS TACHYCARDIA: ICD-10-CM

## 2020-07-21 PROCEDURE — 93268 ECG RECORD/REVIEW: CPT | Performed by: INTERNAL MEDICINE

## 2020-07-27 DIAGNOSIS — R00.2 PALPITATIONS: ICD-10-CM

## 2020-07-27 LAB — EKG IMPRESSION: NORMAL

## 2020-07-29 ENCOUNTER — TELEPHONE (OUTPATIENT)
Dept: CARDIOLOGY | Facility: MEDICAL CENTER | Age: 25
End: 2020-07-29

## 2020-07-31 ENCOUNTER — TELEPHONE (OUTPATIENT)
Dept: CARDIOLOGY | Facility: MEDICAL CENTER | Age: 25
End: 2020-07-31

## 2020-07-31 DIAGNOSIS — I10 ESSENTIAL HYPERTENSION: ICD-10-CM

## 2020-07-31 NOTE — TELEPHONE ENCOUNTER
SW    Patient called to get their lisinopril (PRINIVIL) 5 MG Tab refilled and sent to the [facility] [place] [facility].     Thank you,  Harmony LONG

## 2020-08-03 RX ORDER — LISINOPRIL 5 MG/1
5 TABLET ORAL DAILY
Qty: 30 TAB | Refills: 11 | Status: SHIPPED | OUTPATIENT
Start: 2020-08-03 | End: 2023-07-31

## 2020-08-03 NOTE — TELEPHONE ENCOUNTER
Called patient to clarify med dose as there is a 5mg and 10mg listed. Patient states she's been instructed to split the 10mg, however, the tab is small and not scored. Lisinopril 5mg sent to preferred pharmacy.    Patient also requested to discuss Holter results. Questions/concerns addressed. Patient verbalized understanding.

## 2020-08-06 ENCOUNTER — TELEPHONE (OUTPATIENT)
Dept: CARDIOLOGY | Facility: MEDICAL CENTER | Age: 25
End: 2020-08-06

## 2020-08-07 NOTE — TELEPHONE ENCOUNTER
Keiko says that 2 days after her holter was taken off, she has had the frequent palpitations and they have been really bothersome.  She gets a lightheaded feeling and some dizziness too, almost like she took a shot of ETOH.  She does not know what her BP has been doing (her machine broke).      She was on Labetalol during her pregnancy and she tolerated that well.     To Dr. Bernardo to advise (I left a note on his desk on 8/7 to review this message).

## 2020-08-12 RX ORDER — LABETALOL 200 MG/1
200 TABLET, FILM COATED ORAL EVERY 8 HOURS
Qty: 90 TAB | Refills: 3 | Status: SHIPPED | OUTPATIENT
Start: 2020-08-12 | End: 2023-07-31

## 2020-08-12 NOTE — TELEPHONE ENCOUNTER
Dr. GUTIERREZ:  Can you please advise if patient can go on a beta blocker for her symptoms?  See prior note.

## 2020-08-12 NOTE — TELEPHONE ENCOUNTER
Left message for her to call back.  Medication ordered as below.    Suraj Valadez M.D.  You 5 minutes ago (3:16 PM)     That might be little high, lets do 200mg 3 times daily    Message text

## 2020-08-12 NOTE — TELEPHONE ENCOUNTER
Suraj Valadez M.D.  You 1 minute ago (10:50 AM)     Its fine to take labetalol if she is not pregnant.

## 2020-08-13 NOTE — TELEPHONE ENCOUNTER
I contacted Keiko and she will try the Labetolol and record her BP/HR.  I asked her to call in a week with the updates.  She is not pregnant.

## 2023-07-31 ENCOUNTER — OFFICE VISIT (OUTPATIENT)
Dept: CARDIOLOGY | Facility: MEDICAL CENTER | Age: 28
End: 2023-07-31
Attending: INTERNAL MEDICINE
Payer: MEDICAID

## 2023-07-31 ENCOUNTER — TELEPHONE (OUTPATIENT)
Dept: CARDIOLOGY | Facility: MEDICAL CENTER | Age: 28
End: 2023-07-31

## 2023-07-31 ENCOUNTER — PATIENT MESSAGE (OUTPATIENT)
Dept: CARDIOLOGY | Facility: MEDICAL CENTER | Age: 28
End: 2023-07-31

## 2023-07-31 VITALS
OXYGEN SATURATION: 100 % | HEART RATE: 110 BPM | DIASTOLIC BLOOD PRESSURE: 100 MMHG | WEIGHT: 139 LBS | BODY MASS INDEX: 22.34 KG/M2 | HEIGHT: 66 IN | SYSTOLIC BLOOD PRESSURE: 150 MMHG | RESPIRATION RATE: 16 BRPM

## 2023-07-31 DIAGNOSIS — I10 MALIGNANT HYPERTENSION: ICD-10-CM

## 2023-07-31 DIAGNOSIS — R00.2 PALPITATIONS: ICD-10-CM

## 2023-07-31 DIAGNOSIS — I10 ESSENTIAL HYPERTENSION: ICD-10-CM

## 2023-07-31 DIAGNOSIS — I77.810 AORTIC ROOT DILATATION (HCC): ICD-10-CM

## 2023-07-31 PROCEDURE — 3077F SYST BP >= 140 MM HG: CPT | Performed by: INTERNAL MEDICINE

## 2023-07-31 PROCEDURE — 93005 ELECTROCARDIOGRAM TRACING: CPT | Performed by: INTERNAL MEDICINE

## 2023-07-31 PROCEDURE — 3080F DIAST BP >= 90 MM HG: CPT | Performed by: INTERNAL MEDICINE

## 2023-07-31 PROCEDURE — 99211 OFF/OP EST MAY X REQ PHY/QHP: CPT | Performed by: INTERNAL MEDICINE

## 2023-07-31 PROCEDURE — 99214 OFFICE O/P EST MOD 30 MIN: CPT | Mod: 25 | Performed by: INTERNAL MEDICINE

## 2023-07-31 RX ORDER — LISINOPRIL 5 MG/1
5 TABLET ORAL DAILY
Qty: 90 TABLET | Refills: 3 | Status: SHIPPED | OUTPATIENT
Start: 2023-07-31

## 2023-07-31 RX ORDER — LABETALOL 200 MG/1
200 TABLET, FILM COATED ORAL 2 TIMES DAILY
Qty: 180 TABLET | Refills: 3 | Status: SHIPPED | OUTPATIENT
Start: 2023-07-31

## 2023-07-31 ASSESSMENT — ENCOUNTER SYMPTOMS
SHORTNESS OF BREATH: 0
MYALGIAS: 0
DIZZINESS: 0
LOSS OF CONSCIOUSNESS: 0
PALPITATIONS: 1
COUGH: 0

## 2023-07-31 NOTE — TELEPHONE ENCOUNTER
Phone Number Called: [phone]    Call outcome:  Attempted to call pt x3 with a busy signal    Message:     Attempted to call pt to relay SW recommendations. Busy signal all three times. Will try again later.      ----- Message -----  From: Jaylen Bernardo M.D.  Sent: 7/31/2023   1:06 PM PDT  To: HECTOR Lares  Subject: Patient tests                                    Please contact the patient  I canceled her cardiothoracic chest CTA, too much radiation since she recently had a myocardial perfusion scan 1/2023 at [facility] where she has been followed for the past 2 years.  I restarted her on her medications labetalol and lisinopril  In setting of acute chest CTA will order an echocardiogram.  Thank you

## 2023-07-31 NOTE — PROGRESS NOTES
"Chief Complaint   Patient presents with    Palpitations     Follow up       Subjective     Keiko Maxine Us is a 28 y.o. female who presents today for follow-up cardiac care.    The patient has a history of hypertension (diagnosed age 13) and aortic root dilation, chronic cannabis use     Last seen 7/2/2020, was to follow-up 3 months later.  Followed up with [facility] cardiology.  States she has not been on her medications for nearly 3 years (labetalol 200 mg  tid and lisinopril 5 mg daily).  Was last seen seen at [facility] cardiology 11/29/2022 by SEGUNDO Pickens. Cardiology.  Had MPI 1/3/2023. 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  She reports an increase in palpitations with rapid heartbeats over the past couple months plus a localized left parasternal \"knot\" pushing out of her chest at times.  No other cardiac symptoms.  Prior cardiac MRI unremarkable.      Past medical history  The patient reports a history of hypertension diagnosed at age 13 treated with lisinopril except during her 3 pregnancies in which she was apparently transitioned to labetalol.     Last delivery was 4 months ago 2018 and was started back on lisinopril 5 mg daily.     The patient does not monitor her blood pressure.     The patient was formally last seen as a patient by Dr. Amaya 2 years ago 2017 but recently was in his clinic with her 4-year-old daughter and it was strongly suggested that she secure a cardiology appointment.     The patient has been under a lot of stress.     The patient denies chest pain or shortness of breath.     Has been under the care of Dr. Parks, high risk OB  Last delivery [facility].     Social history  Single.  3 children 6-year-old, 4-year-old and 4-month-old.  Past Medical History:   Diagnosis Date    Anxiety     Depression     Head ache     Hypertension      Past Surgical History:   Procedure Laterality Date    GYN SURGERY      c/s x3    PRIMARY " C SECTION       Family History   Problem Relation Age of Onset    Alcohol/Drug Mother     Hypertension Father     Arthritis Maternal Grandmother     Cancer Maternal Grandfather         skin cancer     Hypertension Maternal Grandfather     Hypertension Paternal Grandmother     Heart Disease Paternal Grandfather     Hypertension Paternal Grandfather      Social History     Socioeconomic History    Marital status: Single     Spouse name: Not on file    Number of children: Not on file    Years of education: Not on file    Highest education level: Not on file   Occupational History    Not on file   Tobacco Use    Smoking status: Former     Packs/day: 1.00     Years: 5.00     Pack years: 5.00     Types: Cigarettes    Smokeless tobacco: Never    Tobacco comments:     quit 3 years ago.    Vaping Use    Vaping Use: Every day    Substances: Nicotine   Substance and Sexual Activity    Alcohol use: Yes     Comment: occ.    Drug use: Yes     Types: Marijuana     Comment: 1-2 times a day -4gm    Sexual activity: Yes     Partners: Male     Birth control/protection: Surgical   Other Topics Concern    Not on file   Social History Narrative    Not on file     Social Determinants of Health     Financial Resource Strain: Not on file   Food Insecurity: Not on file   Transportation Needs: Not on file   Physical Activity: Not on file   Stress: Not on file   Social Connections: Not on file   Intimate Partner Violence: Not on file   Housing Stability: Not on file     No Known Allergies  Outpatient Encounter Medications as of 7/31/2023   Medication Sig Dispense Refill    labetalol (NORMODYNE) 200 MG Tab Take 1 Tablet by mouth 2 times a day. 180 Tablet 3    lisinopril (PRINIVIL) 5 MG Tab Take 1 Tablet by mouth every day. 90 Tablet 3    [DISCONTINUED] labetalol (NORMODYNE) 200 MG Tab Take 1 Tab by mouth every 8 hours. 90 Tab 3    [DISCONTINUED] lisinopril (PRINIVIL) 5 MG Tab Take 1 Tab by mouth every day. 30 Tab 11    [DISCONTINUED]  "clindamycin-benzoyl peroxide (BENZACLIN) gel Apply to affected area once nightly. (Patient not taking: Reported on 7/2/2020) 50 g 0    [DISCONTINUED] benzonatate (TESSALON) 200 MG capsule Take 1 Cap by mouth 3 times a day as needed for Cough. (Patient not taking: Reported on 7/2/2020) 30 Cap 0    [DISCONTINUED] ondansetron (ZOFRAN ODT) 4 MG TABLET DISPERSIBLE Take 1 Tab by mouth every 6 hours as needed. (Patient not taking: Reported on 7/2/2020) 10 Tab 0     No facility-administered encounter medications on file as of 7/31/2023.     Review of Systems   Respiratory:  Negative for cough and shortness of breath.    Cardiovascular:  Positive for palpitations. Negative for chest pain.   Musculoskeletal:  Negative for myalgias.   Neurological:  Negative for dizziness and loss of consciousness.              Objective     BP (!) 150/100 (BP Location: Left arm, Patient Position: Sitting)   Pulse (!) 110   Resp 16   Ht 1.676 m (5' 6\")   Wt 63 kg (139 lb)   SpO2 100%   BMI 22.44 kg/m²     Physical Exam  Vitals reviewed.   Constitutional:       General: She is not in acute distress.     Appearance: She is well-developed.   Eyes:      Conjunctiva/sclera: Conjunctivae normal.      Pupils: Pupils are equal, round, and reactive to light.   Neck:      Vascular: No JVD.   Cardiovascular:      Rate and Rhythm: Normal rate and regular rhythm.      Pulses:           Radial pulses are 1+ on the right side and 1+ on the left side.      Heart sounds: Normal heart sounds. No murmur heard.     No friction rub. No gallop.   Pulmonary:      Effort: Pulmonary effort is normal. No accessory muscle usage or respiratory distress.      Breath sounds: Normal breath sounds. No wheezing or rales.   Abdominal:      General: There is no distension.      Palpations: Abdomen is soft. There is no mass.      Tenderness: There is no abdominal tenderness.   Musculoskeletal:      Cervical back: Normal range of motion and neck supple.      Right lower " leg: No edema.      Left lower leg: No edema.   Skin:     General: Skin is warm and dry.      Findings: No rash.      Nails: There is no clubbing.   Neurological:      Mental Status: She is alert and oriented to person, place, and time.   Psychiatric:         Behavior: Behavior normal.                 EKG 03/08/2019 normal sinus rhythm, rate 73.  Within normal limits.  Reviewed by myself.     MPI 1/3/2023    Left ventricular perfusion is normal.     Normal left ventricle function (EF 57%).     Overall, the patient's exercise capacity was good.     No stress ECG evidence of ischemia.     No prior study for comparison.     ECHOCARDIOGRAM 03/26/2019  Normal left ventricular systolic function.  Left ventricular ejection fraction is visually estimated to be 65%.  Unable to estimate pulmonary artery pressure due to an inadequate   tricuspid regurgitant jet.  No valve abnormalities.   No prior study is available for comparison.    ECHOCARDIOGRAM 1/22/2021 ([facility])  No regional wall motion abnormalities noted.   The Ejection Fraction estimate is 65-70%   There is mild concentric left ventricular hypertrophy.     CARDIAC MRI 11/10/2019  MRA of the thoracic aorta within normal limits. No aneurysmal dilatation or evidence of dissection.     EKG 7/31/2023 sinus rhythm, rate 76, within normal limits, personally reviewed  Assessment & Plan     1. Essential hypertension  EKG    labetalol (NORMODYNE) 200 MG Tab    lisinopril (PRINIVIL) 5 MG Tab    EC-ECHOCARDIOGRAM COMPLETE W/O CONT    CANCELED: CT-CTA COMPLETE THORACOABDOMINAL AORTA      2. Aortic root dilatation (HCC)  CANCELED: CT-CTA COMPLETE THORACOABDOMINAL AORTA      3. Malignant hypertension  LIPID PANEL    Comp Metabolic Panel    TSH+FREE T4    CBC WITHOUT DIFFERENTIAL    RENIN ACTIVITY AND ALDOSTERONE    URINALYSIS (Routine/Complete)    CANCELED: CT-CTA COMPLETE THORACOABDOMINAL AORTA      4. Palpitations            Medical Decision Making: Today's  Assessment/Status/Plan:   Assessment  Hypertension.   Palpitations.  History of aortic root dilation, normal by cardiac MRI.  Memory problems.  Anxiety/depression.  Chronic marijuana use.     Recommendation Discussion  For the patient's palpitations and hypertension we will have her restart her medication with labetalol and lisinopril.  Her hypertension has been attributed to preeclampsia with reported negative work-up for other secondary causes by [facility] pediatric cardiologist Cesar Oconnor.  I stressed the need for compliance with her medications.  I reviewed her most recent diagnostic cardiac tests at [facility] cardiology which shows no evidence of ischemia, normal LVEF but with mild left ventricular hypertrophy.  Repeat echocardiogram to assess ascending aorta and aortic root which was normal by cardiac MRI  Laboratory tests  Instructed patient to monitor BP daily, provided home BP log sheet.  RTC 4 weeks with JOHNNY.

## 2023-08-02 LAB — EKG IMPRESSION: NORMAL

## 2023-08-02 PROCEDURE — 93010 ELECTROCARDIOGRAM REPORT: CPT | Performed by: INTERNAL MEDICINE

## 2023-08-04 NOTE — TELEPHONE ENCOUNTER
Pt stating you asked that she not start her blood pressure medications until we review her lab results. She had labs done at [facility], so we have to wait for these to be sent to us, asking MA to request.   Last OV note 7/31 states pt to start labetalol and lisinopril. No notation of waiting. Ok to tell pt to start meds while we wait on results?

## 2023-08-07 ENCOUNTER — TELEPHONE (OUTPATIENT)
Dept: CARDIOLOGY | Facility: MEDICAL CENTER | Age: 28
End: 2023-08-07
Payer: MEDICAID

## 2023-08-07 NOTE — TELEPHONE ENCOUNTER
BRIANNA        Caller: Keiko Goodman [facility]      Topic/issue: Patient was calling about her medications and she was calling about her medications and she was asking for clarification as to when and how much she should take and was asking for a call back      Callback Number: [phone]      Thank you      -Luis ABDUL

## 2023-08-08 NOTE — PATIENT COMMUNICATION
To Belle: can you please print [facility] and scan in to media when you have a chance? Thank you!    To BRIANNA: pt provided the results of her labs in her [employer] msg. Looks mostly normal, other than slightly low Hgb. Pt knows to start on her BP medications that you prescribed and to start her BP log and let us know how she is doing. Any other changes to her POC at this time?

## 2023-08-08 NOTE — PATIENT COMMUNICATION
Requested lab results from [facility] in [place], F: [phone] via [email or web address]. Received receipt confirmation. Awaiting results from labs. See tele encounter dated 08/07/2023 regarding phone call back to pt.

## 2023-08-08 NOTE — TELEPHONE ENCOUNTER
Phone Number Called: [phone]    Call outcome: Did not leave a detailed message. Requested patient to call back.    Message:     Called pt back using phone number provided in previous [facility] msg that pt states she is currently using. Unable to get a hold of pt-LVM stating that pt can call us back and/or that [employer] response would also be sent to her.

## 2023-08-09 ENCOUNTER — TELEPHONE (OUTPATIENT)
Dept: CARDIOLOGY | Facility: MEDICAL CENTER | Age: 28
End: 2023-08-09
Payer: MEDICAID

## 2023-08-10 NOTE — TELEPHONE ENCOUNTER
Tried to call pt X2, 1st call was disconnected before I could speak to pt and 2nd was busy signal.

## 2023-08-12 NOTE — PROGRESS NOTES
"\"Tried to call pt X2, 1st call was disconnected before I could speak to pt and 2nd was busy signal.\"  Birgit Diamond    I am responding to the above message, keep trying  "

## 2023-08-24 ENCOUNTER — ANCILLARY PROCEDURE (OUTPATIENT)
Dept: CARDIOLOGY | Facility: MEDICAL CENTER | Age: 28
End: 2023-08-24
Attending: INTERNAL MEDICINE
Payer: MEDICAID

## 2023-08-24 DIAGNOSIS — I10 ESSENTIAL HYPERTENSION: ICD-10-CM

## 2023-08-24 PROCEDURE — 93306 TTE W/DOPPLER COMPLETE: CPT

## 2023-08-28 LAB
LV EJECT FRACT  99904: 60
LV EJECT FRACT MOD 2C 99903: 52.69
LV EJECT FRACT MOD 4C 99902: 55.63
LV EJECT FRACT MOD BP 99901: 54.6

## 2023-08-28 PROCEDURE — 93306 TTE W/DOPPLER COMPLETE: CPT | Mod: 26 | Performed by: INTERNAL MEDICINE

## 2023-08-29 NOTE — RESULT ENCOUNTER NOTE
Keiko I have reviewed your echocardiogram and it looks completely normal specifically the aorta is normal size  I see that you have an appointment coming up in the next 3 days and if you have any questions please have them available at that time.

## 2023-09-01 ENCOUNTER — TELEPHONE (OUTPATIENT)
Dept: CARDIOLOGY | Facility: MEDICAL CENTER | Age: 28
End: 2023-09-01
Payer: MEDICAID